# Patient Record
Sex: MALE | Race: WHITE | NOT HISPANIC OR LATINO | Employment: OTHER | URBAN - METROPOLITAN AREA
[De-identification: names, ages, dates, MRNs, and addresses within clinical notes are randomized per-mention and may not be internally consistent; named-entity substitution may affect disease eponyms.]

---

## 2019-05-21 ENCOUNTER — RECORDS - HEALTHEAST (OUTPATIENT)
Dept: LAB | Facility: CLINIC | Age: 69
End: 2019-05-21

## 2019-05-22 LAB
ALBUMIN SERPL-MCNC: 3.4 G/DL (ref 3.5–5)
ALP SERPL-CCNC: 73 U/L (ref 45–120)
ALT SERPL W P-5'-P-CCNC: 52 U/L (ref 0–45)
ANION GAP SERPL CALCULATED.3IONS-SCNC: 8 MMOL/L (ref 5–18)
AST SERPL W P-5'-P-CCNC: 27 U/L (ref 0–40)
BASOPHILS # BLD AUTO: 0.1 THOU/UL (ref 0–0.2)
BASOPHILS NFR BLD AUTO: 1 % (ref 0–2)
BILIRUB DIRECT SERPL-MCNC: 0.2 MG/DL
BILIRUB SERPL-MCNC: 0.6 MG/DL (ref 0–1)
BUN SERPL-MCNC: 14 MG/DL (ref 8–22)
CALCIUM SERPL-MCNC: 9.4 MG/DL (ref 8.5–10.5)
CHLORIDE BLD-SCNC: 106 MMOL/L (ref 98–107)
CHOLEST SERPL-MCNC: 228 MG/DL
CO2 SERPL-SCNC: 24 MMOL/L (ref 22–31)
CREAT SERPL-MCNC: 0.99 MG/DL (ref 0.7–1.3)
EOSINOPHIL # BLD AUTO: 0.5 THOU/UL (ref 0–0.4)
EOSINOPHIL NFR BLD AUTO: 5 % (ref 0–6)
ERYTHROCYTE [DISTWIDTH] IN BLOOD BY AUTOMATED COUNT: 12.9 % (ref 11–14.5)
FASTING STATUS PATIENT QL REPORTED: ABNORMAL
GFR SERPL CREATININE-BSD FRML MDRD: >60 ML/MIN/1.73M2
GLUCOSE BLD-MCNC: 115 MG/DL (ref 70–125)
HBA1C MFR BLD: 6.1 % (ref 4.2–6.1)
HCT VFR BLD AUTO: 48.5 % (ref 40–54)
HDLC SERPL-MCNC: 31 MG/DL
HGB BLD-MCNC: 15.8 G/DL (ref 14–18)
LDLC SERPL CALC-MCNC: 121 MG/DL
LYMPHOCYTES # BLD AUTO: 2.2 THOU/UL (ref 0.8–4.4)
LYMPHOCYTES NFR BLD AUTO: 22 % (ref 20–40)
MCH RBC QN AUTO: 33.9 PG (ref 27–34)
MCHC RBC AUTO-ENTMCNC: 32.6 G/DL (ref 32–36)
MCV RBC AUTO: 104 FL (ref 80–100)
MONOCYTES # BLD AUTO: 1 THOU/UL (ref 0–0.9)
MONOCYTES NFR BLD AUTO: 10 % (ref 2–10)
NEUTROPHILS # BLD AUTO: 6.4 THOU/UL (ref 2–7.7)
NEUTROPHILS NFR BLD AUTO: 63 % (ref 50–70)
PLATELET # BLD AUTO: 240 THOU/UL (ref 140–440)
PMV BLD AUTO: 11.1 FL (ref 8.5–12.5)
POTASSIUM BLD-SCNC: 4.3 MMOL/L (ref 3.5–5)
PROT SERPL-MCNC: 6.2 G/DL (ref 6–8)
RBC # BLD AUTO: 4.66 MILL/UL (ref 4.4–6.2)
SODIUM SERPL-SCNC: 138 MMOL/L (ref 136–145)
TRIGL SERPL-MCNC: 380 MG/DL
TSH SERPL DL<=0.005 MIU/L-ACNC: 1.55 UIU/ML (ref 0.3–5)
WBC: 10.3 THOU/UL (ref 4–11)

## 2019-07-31 ENCOUNTER — RECORDS - HEALTHEAST (OUTPATIENT)
Dept: LAB | Facility: CLINIC | Age: 69
End: 2019-07-31

## 2019-07-31 LAB
ALBUMIN SERPL-MCNC: 3.7 G/DL (ref 3.5–5)
ALP SERPL-CCNC: 115 U/L (ref 45–120)
ALT SERPL W P-5'-P-CCNC: 68 U/L (ref 0–45)
AST SERPL W P-5'-P-CCNC: 34 U/L (ref 0–40)
AST SERPL W P-5'-P-CCNC: 34 U/L (ref 0–40)
BILIRUB DIRECT SERPL-MCNC: <0.1 MG/DL
BILIRUB SERPL-MCNC: 0.2 MG/DL (ref 0–1)
CHOLEST SERPL-MCNC: 182 MG/DL
FASTING STATUS PATIENT QL REPORTED: YES
FERRITIN SERPL-MCNC: 150 NG/ML (ref 27–300)
HCV AB SERPL QL IA: NEGATIVE
HDLC SERPL-MCNC: 35 MG/DL
IRON SATN MFR SERPL: 31 % (ref 20–50)
IRON SERPL-MCNC: 81 UG/DL (ref 42–175)
LDLC SERPL CALC-MCNC: 93 MG/DL
PROT SERPL-MCNC: 6.6 G/DL (ref 6–8)
TIBC SERPL-MCNC: 264 UG/DL (ref 313–563)
TRANSFERRIN SERPL-MCNC: 211 MG/DL (ref 212–360)
TRIGL SERPL-MCNC: 268 MG/DL
VALPROATE SERPL-MCNC: 14.1 UG/ML (ref 50–150)

## 2019-09-04 ENCOUNTER — RECORDS - HEALTHEAST (OUTPATIENT)
Dept: LAB | Facility: CLINIC | Age: 69
End: 2019-09-04

## 2019-09-04 LAB — HGB BLD-MCNC: 14.5 G/DL (ref 14–18)

## 2019-09-05 LAB — HBA1C MFR BLD: 6.5 % (ref 4.2–6.1)

## 2019-10-02 ENCOUNTER — RECORDS - HEALTHEAST (OUTPATIENT)
Dept: LAB | Facility: CLINIC | Age: 69
End: 2019-10-02

## 2019-10-02 LAB
PSA SERPL-MCNC: 7.6 NG/ML (ref 0–4.5)
PSA SERPL-MCNC: 7.8 NG/ML (ref 0–4.5)

## 2020-02-06 ENCOUNTER — RECORDS - HEALTHEAST (OUTPATIENT)
Dept: LAB | Facility: CLINIC | Age: 70
End: 2020-02-06

## 2020-02-06 LAB
CREAT SERPL-MCNC: 1.01 MG/DL (ref 0.7–1.3)
GFR SERPL CREATININE-BSD FRML MDRD: >60 ML/MIN/1.73M2

## 2020-03-04 ENCOUNTER — RECORDS - HEALTHEAST (OUTPATIENT)
Dept: LAB | Facility: CLINIC | Age: 70
End: 2020-03-04

## 2020-03-04 LAB — HBA1C MFR BLD: 7.2 %

## 2020-10-13 ENCOUNTER — RECORDS - HEALTHEAST (OUTPATIENT)
Dept: LAB | Facility: CLINIC | Age: 70
End: 2020-10-13

## 2020-10-13 LAB
CREAT UR-MCNC: 213.3 MG/DL
MICROALBUMIN UR-MCNC: 3.35 MG/DL (ref 0–1.99)
MICROALBUMIN/CREAT UR: 15.7 MG/G

## 2020-10-14 LAB
ANION GAP SERPL CALCULATED.3IONS-SCNC: 8 MMOL/L (ref 5–18)
BUN SERPL-MCNC: 7 MG/DL (ref 8–22)
CALCIUM SERPL-MCNC: 8.3 MG/DL (ref 8.5–10.5)
CHLORIDE BLD-SCNC: 106 MMOL/L (ref 98–107)
CHOLEST SERPL-MCNC: 149 MG/DL
CO2 SERPL-SCNC: 24 MMOL/L (ref 22–31)
CREAT SERPL-MCNC: 0.91 MG/DL (ref 0.7–1.3)
ERYTHROCYTE [DISTWIDTH] IN BLOOD BY AUTOMATED COUNT: 12.9 % (ref 11–14.5)
FASTING STATUS PATIENT QL REPORTED: YES
GFR SERPL CREATININE-BSD FRML MDRD: >60 ML/MIN/1.73M2
GLUCOSE BLD-MCNC: 127 MG/DL (ref 70–125)
HBA1C MFR BLD: 8.5 %
HCT VFR BLD AUTO: 43.4 % (ref 40–54)
HDLC SERPL-MCNC: 26 MG/DL
HGB BLD-MCNC: 14.7 G/DL (ref 14–18)
LDLC SERPL CALC-MCNC: 89 MG/DL
MCH RBC QN AUTO: 33 PG (ref 27–34)
MCHC RBC AUTO-ENTMCNC: 33.9 G/DL (ref 32–36)
MCV RBC AUTO: 97 FL (ref 80–100)
PLATELET # BLD AUTO: 236 THOU/UL (ref 140–440)
PMV BLD AUTO: 11.3 FL (ref 8.5–12.5)
POTASSIUM BLD-SCNC: 3.9 MMOL/L (ref 3.5–5)
RBC # BLD AUTO: 4.46 MILL/UL (ref 4.4–6.2)
SODIUM SERPL-SCNC: 138 MMOL/L (ref 136–145)
TRIGL SERPL-MCNC: 169 MG/DL
WBC: 8.2 THOU/UL (ref 4–11)

## 2021-01-26 ENCOUNTER — RECORDS - HEALTHEAST (OUTPATIENT)
Dept: LAB | Facility: CLINIC | Age: 71
End: 2021-01-26

## 2021-01-27 LAB
HBA1C MFR BLD: 6.6 %
HGB BLD-MCNC: 15.6 G/DL (ref 14–18)
PSA SERPL-MCNC: 6.2 NG/ML (ref 0–4.5)

## 2021-04-06 ENCOUNTER — RECORDS - HEALTHEAST (OUTPATIENT)
Dept: LAB | Facility: CLINIC | Age: 71
End: 2021-04-06

## 2021-04-07 LAB
ANION GAP SERPL CALCULATED.3IONS-SCNC: 12 MMOL/L (ref 5–18)
BUN SERPL-MCNC: 17 MG/DL (ref 8–22)
CALCIUM SERPL-MCNC: 8.3 MG/DL (ref 8.5–10.5)
CHLORIDE BLD-SCNC: 107 MMOL/L (ref 98–107)
CO2 SERPL-SCNC: 19 MMOL/L (ref 22–31)
CREAT SERPL-MCNC: 0.9 MG/DL (ref 0.7–1.3)
ERYTHROCYTE [DISTWIDTH] IN BLOOD BY AUTOMATED COUNT: 13 % (ref 11–14.5)
FOLATE SERPL-MCNC: 5.8 NG/ML
GFR SERPL CREATININE-BSD FRML MDRD: >60 ML/MIN/1.73M2
GLUCOSE BLD-MCNC: 176 MG/DL (ref 70–125)
HCT VFR BLD AUTO: 45.8 % (ref 40–54)
HGB BLD-MCNC: 15 G/DL (ref 14–18)
MCH RBC QN AUTO: 33.2 PG (ref 27–34)
MCHC RBC AUTO-ENTMCNC: 32.8 G/DL (ref 32–36)
MCV RBC AUTO: 101 FL (ref 80–100)
PLATELET # BLD AUTO: 240 THOU/UL (ref 140–440)
PMV BLD AUTO: 11.2 FL (ref 8.5–12.5)
POTASSIUM BLD-SCNC: 3.9 MMOL/L (ref 3.5–5)
RBC # BLD AUTO: 4.52 MILL/UL (ref 4.4–6.2)
SODIUM SERPL-SCNC: 138 MMOL/L (ref 136–145)
TSH SERPL DL<=0.005 MIU/L-ACNC: 1.76 UIU/ML (ref 0.3–5)
VIT B12 SERPL-MCNC: 252 PG/ML (ref 213–816)
WBC: 8.4 THOU/UL (ref 4–11)

## 2021-05-28 ENCOUNTER — DOCUMENTATION ONLY (OUTPATIENT)
Dept: OTHER | Facility: CLINIC | Age: 71
End: 2021-05-28

## 2021-05-28 ENCOUNTER — AMBULATORY - HEALTHEAST (OUTPATIENT)
Dept: OTHER | Facility: CLINIC | Age: 71
End: 2021-05-28

## 2021-05-31 ENCOUNTER — RECORDS - HEALTHEAST (OUTPATIENT)
Dept: LAB | Facility: CLINIC | Age: 71
End: 2021-05-31

## 2021-06-02 LAB
HBA1C MFR BLD: 6.4 %
HGB BLD-MCNC: 15.4 G/DL (ref 14–18)

## 2021-10-06 ENCOUNTER — LAB REQUISITION (OUTPATIENT)
Dept: LAB | Facility: CLINIC | Age: 71
End: 2021-10-06
Payer: COMMERCIAL

## 2021-10-06 DIAGNOSIS — E78.5 HYPERLIPIDEMIA, UNSPECIFIED: ICD-10-CM

## 2021-10-06 DIAGNOSIS — N40.1 BENIGN PROSTATIC HYPERPLASIA WITH LOWER URINARY TRACT SYMPTOMS: ICD-10-CM

## 2021-10-06 LAB
CHOLEST SERPL-MCNC: 151 MG/DL
FASTING STATUS PATIENT QL REPORTED: ABNORMAL
HDLC SERPL-MCNC: 36 MG/DL
LDLC SERPL CALC-MCNC: 79 MG/DL
PSA SERPL-MCNC: 5.92 UG/L (ref 0–4.5)
TRIGL SERPL-MCNC: 179 MG/DL

## 2021-12-07 ENCOUNTER — LAB REQUISITION (OUTPATIENT)
Dept: LAB | Facility: CLINIC | Age: 71
End: 2021-12-07
Payer: COMMERCIAL

## 2021-12-07 DIAGNOSIS — E11.00 TYPE 2 DIABETES MELLITUS WITH HYPEROSMOLARITY WITHOUT NONKETOTIC HYPERGLYCEMIC-HYPEROSMOLAR COMA (NKHHC) (H): ICD-10-CM

## 2021-12-07 DIAGNOSIS — E11.9 TYPE 2 DIABETES MELLITUS WITHOUT COMPLICATIONS (H): ICD-10-CM

## 2021-12-07 DIAGNOSIS — N40.1 BENIGN PROSTATIC HYPERPLASIA WITH LOWER URINARY TRACT SYMPTOMS: ICD-10-CM

## 2021-12-08 LAB
ANION GAP SERPL CALCULATED.3IONS-SCNC: 12 MMOL/L (ref 5–18)
BUN SERPL-MCNC: 10 MG/DL (ref 8–22)
CALCIUM SERPL-MCNC: 9.3 MG/DL (ref 8.5–10.5)
CHLORIDE BLD-SCNC: 104 MMOL/L (ref 98–107)
CO2 SERPL-SCNC: 24 MMOL/L (ref 22–31)
CREAT SERPL-MCNC: 0.94 MG/DL (ref 0.7–1.3)
ERYTHROCYTE [DISTWIDTH] IN BLOOD BY AUTOMATED COUNT: 12.5 % (ref 10–15)
GFR SERPL CREATININE-BSD FRML MDRD: 82 ML/MIN/1.73M2
GLUCOSE BLD-MCNC: 128 MG/DL (ref 70–125)
HBA1C MFR BLD: 6.4 %
HCT VFR BLD AUTO: 46.6 % (ref 40–53)
HGB BLD-MCNC: 15.5 G/DL (ref 13.3–17.7)
MCH RBC QN AUTO: 34.4 PG (ref 26.5–33)
MCHC RBC AUTO-ENTMCNC: 33.3 G/DL (ref 31.5–36.5)
MCV RBC AUTO: 104 FL (ref 78–100)
PLATELET # BLD AUTO: 302 10E3/UL (ref 150–450)
POTASSIUM BLD-SCNC: 4.5 MMOL/L (ref 3.5–5)
RBC # BLD AUTO: 4.5 10E6/UL (ref 4.4–5.9)
SODIUM SERPL-SCNC: 140 MMOL/L (ref 136–145)
WBC # BLD AUTO: 10 10E3/UL (ref 4–11)

## 2021-12-08 PROCEDURE — 83036 HEMOGLOBIN GLYCOSYLATED A1C: CPT | Mod: ORL | Performed by: NURSE PRACTITIONER

## 2021-12-08 PROCEDURE — 80048 BASIC METABOLIC PNL TOTAL CA: CPT | Mod: ORL | Performed by: NURSE PRACTITIONER

## 2021-12-08 PROCEDURE — 85027 COMPLETE CBC AUTOMATED: CPT | Mod: ORL | Performed by: NURSE PRACTITIONER

## 2021-12-08 PROCEDURE — 36415 COLL VENOUS BLD VENIPUNCTURE: CPT | Mod: ORL | Performed by: NURSE PRACTITIONER

## 2021-12-08 PROCEDURE — P9604 ONE-WAY ALLOW PRORATED TRIP: HCPCS | Mod: ORL | Performed by: NURSE PRACTITIONER

## 2021-12-14 ENCOUNTER — LAB REQUISITION (OUTPATIENT)
Dept: LAB | Facility: CLINIC | Age: 71
End: 2021-12-14
Payer: COMMERCIAL

## 2021-12-14 DIAGNOSIS — D75.89 OTHER SPECIFIED DISEASES OF BLOOD AND BLOOD-FORMING ORGANS: ICD-10-CM

## 2021-12-15 LAB
FOLATE SERPL-MCNC: 4 NG/ML
VIT B12 SERPL-MCNC: 1273 PG/ML (ref 213–816)

## 2021-12-15 PROCEDURE — P9604 ONE-WAY ALLOW PRORATED TRIP: HCPCS | Mod: ORL | Performed by: NURSE PRACTITIONER

## 2021-12-15 PROCEDURE — 36415 COLL VENOUS BLD VENIPUNCTURE: CPT | Mod: ORL | Performed by: NURSE PRACTITIONER

## 2021-12-15 PROCEDURE — 82607 VITAMIN B-12: CPT | Mod: ORL | Performed by: NURSE PRACTITIONER

## 2021-12-15 PROCEDURE — 82746 ASSAY OF FOLIC ACID SERUM: CPT | Mod: ORL | Performed by: NURSE PRACTITIONER

## 2022-01-01 ENCOUNTER — TELEPHONE (OUTPATIENT)
Dept: GERIATRICS | Facility: CLINIC | Age: 72
End: 2022-01-01

## 2022-01-01 ENCOUNTER — LAB REQUISITION (OUTPATIENT)
Dept: LAB | Facility: CLINIC | Age: 72
End: 2022-01-01
Payer: COMMERCIAL

## 2022-01-01 ENCOUNTER — DOCUMENTATION ONLY (OUTPATIENT)
Dept: GERIATRICS | Facility: CLINIC | Age: 72
End: 2022-01-01

## 2022-01-01 ENCOUNTER — TRANSITIONAL CARE UNIT VISIT (OUTPATIENT)
Dept: GERIATRICS | Facility: CLINIC | Age: 72
End: 2022-01-01
Payer: COMMERCIAL

## 2022-01-01 VITALS
DIASTOLIC BLOOD PRESSURE: 72 MMHG | RESPIRATION RATE: 18 BRPM | OXYGEN SATURATION: 96 % | TEMPERATURE: 97.5 F | HEART RATE: 86 BPM | WEIGHT: 210 LBS | BODY MASS INDEX: 26.11 KG/M2 | HEIGHT: 75 IN | SYSTOLIC BLOOD PRESSURE: 130 MMHG

## 2022-01-01 VITALS
BODY MASS INDEX: 28.73 KG/M2 | SYSTOLIC BLOOD PRESSURE: 132 MMHG | RESPIRATION RATE: 18 BRPM | WEIGHT: 194 LBS | TEMPERATURE: 97.2 F | HEART RATE: 62 BPM | OXYGEN SATURATION: 97 % | DIASTOLIC BLOOD PRESSURE: 77 MMHG | HEIGHT: 69 IN

## 2022-01-01 DIAGNOSIS — Z87.820 HISTORY OF TRAUMATIC BRAIN INJURY: ICD-10-CM

## 2022-01-01 DIAGNOSIS — R29.6 FALLS FREQUENTLY: ICD-10-CM

## 2022-01-01 DIAGNOSIS — D64.9 ANEMIA, UNSPECIFIED: ICD-10-CM

## 2022-01-01 DIAGNOSIS — E53.8 VITAMIN B 12 DEFICIENCY: ICD-10-CM

## 2022-01-01 DIAGNOSIS — E11.9 CONTROLLED TYPE 2 DIABETES MELLITUS WITHOUT COMPLICATION, WITHOUT LONG-TERM CURRENT USE OF INSULIN (H): ICD-10-CM

## 2022-01-01 DIAGNOSIS — F32.A DEPRESSION, UNSPECIFIED DEPRESSION TYPE: ICD-10-CM

## 2022-01-01 DIAGNOSIS — Z72.0 TOBACCO ABUSE: ICD-10-CM

## 2022-01-01 DIAGNOSIS — S73.035D ANTERIOR DISLOCATION OF LEFT HIP, SUBSEQUENT ENCOUNTER: Primary | ICD-10-CM

## 2022-01-01 DIAGNOSIS — E11.9 TYPE 2 DIABETES MELLITUS WITHOUT COMPLICATION, WITHOUT LONG-TERM CURRENT USE OF INSULIN (H): ICD-10-CM

## 2022-01-01 DIAGNOSIS — E78.5 HYPERLIPIDEMIA LDL GOAL <100: ICD-10-CM

## 2022-01-01 DIAGNOSIS — R52 PAIN: Primary | ICD-10-CM

## 2022-01-01 DIAGNOSIS — E11.9 TYPE 2 DIABETES MELLITUS WITHOUT COMPLICATIONS (H): ICD-10-CM

## 2022-01-01 DIAGNOSIS — R84.0 ABNORMAL LEVEL OF ENZYMES IN SPECIMENS FROM RESPIRATORY ORGANS AND THORAX: ICD-10-CM

## 2022-01-01 DIAGNOSIS — R53.81 PHYSICAL DECONDITIONING: ICD-10-CM

## 2022-01-01 DIAGNOSIS — S72.002D CLOSED FRACTURE OF NECK OF LEFT FEMUR WITH ROUTINE HEALING, SUBSEQUENT ENCOUNTER: ICD-10-CM

## 2022-01-01 DIAGNOSIS — D72.829 LEUKOCYTOSIS, UNSPECIFIED TYPE: ICD-10-CM

## 2022-01-01 DIAGNOSIS — I10 PRIMARY HYPERTENSION: ICD-10-CM

## 2022-01-01 DIAGNOSIS — R41.89 COGNITIVE IMPAIRMENT: ICD-10-CM

## 2022-01-01 DIAGNOSIS — E78.5 HYPERLIPIDEMIA, UNSPECIFIED: ICD-10-CM

## 2022-01-01 LAB
ANION GAP SERPL CALCULATED.3IONS-SCNC: 12 MMOL/L (ref 7–15)
BUN SERPL-MCNC: 17.2 MG/DL (ref 8–23)
CALCIUM SERPL-MCNC: 8.5 MG/DL (ref 8.8–10.2)
CHLORIDE SERPL-SCNC: 103 MMOL/L (ref 98–107)
CREAT SERPL-MCNC: 0.85 MG/DL (ref 0.67–1.17)
DEPRECATED HCO3 PLAS-SCNC: 22 MMOL/L (ref 22–29)
ERYTHROCYTE [DISTWIDTH] IN BLOOD BY AUTOMATED COUNT: 13 % (ref 10–15)
ERYTHROCYTE [DISTWIDTH] IN BLOOD BY AUTOMATED COUNT: 13.2 % (ref 10–15)
ERYTHROCYTE [DISTWIDTH] IN BLOOD BY AUTOMATED COUNT: 13.2 % (ref 10–15)
GFR SERPL CREATININE-BSD FRML MDRD: >90 ML/MIN/1.73M2
GLUCOSE SERPL-MCNC: 170 MG/DL (ref 70–99)
HCT VFR BLD AUTO: 38.6 % (ref 40–53)
HCT VFR BLD AUTO: 39.3 % (ref 40–53)
HCT VFR BLD AUTO: 41.5 % (ref 40–53)
HGB BLD-MCNC: 12.3 G/DL (ref 13.3–17.7)
HGB BLD-MCNC: 12.7 G/DL (ref 13.3–17.7)
HGB BLD-MCNC: 12.9 G/DL (ref 13.3–17.7)
MCH RBC QN AUTO: 33.2 PG (ref 26.5–33)
MCH RBC QN AUTO: 33.2 PG (ref 26.5–33)
MCH RBC QN AUTO: 33.4 PG (ref 26.5–33)
MCHC RBC AUTO-ENTMCNC: 31.1 G/DL (ref 31.5–36.5)
MCHC RBC AUTO-ENTMCNC: 31.9 G/DL (ref 31.5–36.5)
MCHC RBC AUTO-ENTMCNC: 32.3 G/DL (ref 31.5–36.5)
MCV RBC AUTO: 103 FL (ref 78–100)
MCV RBC AUTO: 104 FL (ref 78–100)
MCV RBC AUTO: 107 FL (ref 78–100)
PLATELET # BLD AUTO: 486 10E3/UL (ref 150–450)
PLATELET # BLD AUTO: 507 10E3/UL (ref 150–450)
PLATELET # BLD AUTO: 572 10E3/UL (ref 150–450)
POTASSIUM SERPL-SCNC: 3.7 MMOL/L (ref 3.4–5.3)
RBC # BLD AUTO: 3.7 10E6/UL (ref 4.4–5.9)
RBC # BLD AUTO: 3.8 10E6/UL (ref 4.4–5.9)
RBC # BLD AUTO: 3.88 10E6/UL (ref 4.4–5.9)
SODIUM SERPL-SCNC: 137 MMOL/L (ref 136–145)
WBC # BLD AUTO: 11.9 10E3/UL (ref 4–11)
WBC # BLD AUTO: 12.1 10E3/UL (ref 4–11)
WBC # BLD AUTO: 13.4 10E3/UL (ref 4–11)

## 2022-01-01 PROCEDURE — P9604 ONE-WAY ALLOW PRORATED TRIP: HCPCS | Mod: ORL | Performed by: NURSE PRACTITIONER

## 2022-01-01 PROCEDURE — 36415 COLL VENOUS BLD VENIPUNCTURE: CPT | Mod: ORL | Performed by: NURSE PRACTITIONER

## 2022-01-01 PROCEDURE — 85027 COMPLETE CBC AUTOMATED: CPT | Mod: ORL | Performed by: NURSE PRACTITIONER

## 2022-01-01 PROCEDURE — P9603 ONE-WAY ALLOW PRORATED MILES: HCPCS | Mod: ORL | Performed by: NURSE PRACTITIONER

## 2022-01-01 PROCEDURE — 99316 NF DSCHRG MGMT 30 MIN+: CPT | Performed by: NURSE PRACTITIONER

## 2022-01-01 PROCEDURE — 99310 SBSQ NF CARE HIGH MDM 45: CPT | Performed by: NURSE PRACTITIONER

## 2022-01-01 PROCEDURE — 80048 BASIC METABOLIC PNL TOTAL CA: CPT | Mod: ORL | Performed by: NURSE PRACTITIONER

## 2022-01-01 RX ORDER — DIVALPROEX SODIUM 500 MG/1
500 TABLET, DELAYED RELEASE ORAL DAILY
Start: 2022-01-01

## 2022-01-01 RX ORDER — ACETAMINOPHEN 325 MG/1
325 TABLET ORAL EVERY 4 HOURS PRN
Start: 2022-01-01

## 2022-01-01 RX ORDER — CITALOPRAM HYDROBROMIDE 20 MG/1
20 TABLET ORAL DAILY
Start: 2022-01-01

## 2022-01-01 RX ORDER — OXYCODONE HYDROCHLORIDE 5 MG/1
5 TABLET ORAL EVERY 4 HOURS PRN
COMMUNITY
End: 2022-01-01

## 2022-01-01 RX ORDER — MEMANTINE HYDROCHLORIDE 10 MG/1
10 TABLET ORAL 2 TIMES DAILY
Start: 2022-01-01

## 2022-01-01 RX ORDER — PRAVASTATIN SODIUM 40 MG
40 TABLET ORAL DAILY
Start: 2022-01-01

## 2022-01-01 RX ORDER — AMLODIPINE BESYLATE 2.5 MG/1
2.5 TABLET ORAL DAILY
Start: 2022-01-01

## 2022-01-01 RX ORDER — METHOCARBAMOL 500 MG/1
500 TABLET, FILM COATED ORAL 4 TIMES DAILY PRN
Start: 2022-01-01

## 2022-01-01 RX ORDER — METFORMIN HCL 500 MG
1000 TABLET, EXTENDED RELEASE 24 HR ORAL
Start: 2022-01-01

## 2022-01-01 RX ORDER — OXYCODONE HYDROCHLORIDE 5 MG/1
5 TABLET ORAL EVERY 4 HOURS PRN
Qty: 30 TABLET | Refills: 0 | Status: SHIPPED | OUTPATIENT
Start: 2022-01-01

## 2022-04-26 ENCOUNTER — LAB REQUISITION (OUTPATIENT)
Dept: LAB | Facility: CLINIC | Age: 72
End: 2022-04-26
Payer: COMMERCIAL

## 2022-04-26 DIAGNOSIS — Z12.5 ENCOUNTER FOR SCREENING FOR MALIGNANT NEOPLASM OF PROSTATE: ICD-10-CM

## 2022-04-26 DIAGNOSIS — E11.9 TYPE 2 DIABETES MELLITUS WITHOUT COMPLICATIONS (H): ICD-10-CM

## 2022-04-26 DIAGNOSIS — N18.9 CHRONIC KIDNEY DISEASE, UNSPECIFIED: ICD-10-CM

## 2022-04-27 LAB
ANION GAP SERPL CALCULATED.3IONS-SCNC: 15 MMOL/L (ref 5–18)
BUN SERPL-MCNC: 14 MG/DL (ref 8–28)
CALCIUM SERPL-MCNC: 8.8 MG/DL (ref 8.5–10.5)
CHLORIDE BLD-SCNC: 104 MMOL/L (ref 98–107)
CO2 SERPL-SCNC: 21 MMOL/L (ref 22–31)
CREAT SERPL-MCNC: 0.82 MG/DL (ref 0.7–1.3)
ERYTHROCYTE [DISTWIDTH] IN BLOOD BY AUTOMATED COUNT: 12.7 % (ref 10–15)
GFR SERPL CREATININE-BSD FRML MDRD: >90 ML/MIN/1.73M2
GLUCOSE BLD-MCNC: 157 MG/DL (ref 70–125)
HBA1C MFR BLD: 5.9 %
HCT VFR BLD AUTO: 44.1 % (ref 40–53)
HGB BLD-MCNC: 15 G/DL (ref 13.3–17.7)
MCH RBC QN AUTO: 35.5 PG (ref 26.5–33)
MCHC RBC AUTO-ENTMCNC: 34 G/DL (ref 31.5–36.5)
MCV RBC AUTO: 105 FL (ref 78–100)
PLATELET # BLD AUTO: 267 10E3/UL (ref 150–450)
POTASSIUM BLD-SCNC: 3.7 MMOL/L (ref 3.5–5)
PSA SERPL-MCNC: 5.48 UG/L (ref 0–6.5)
RBC # BLD AUTO: 4.22 10E6/UL (ref 4.4–5.9)
SODIUM SERPL-SCNC: 140 MMOL/L (ref 136–145)
WBC # BLD AUTO: 6.2 10E3/UL (ref 4–11)

## 2022-04-27 PROCEDURE — P9604 ONE-WAY ALLOW PRORATED TRIP: HCPCS | Mod: ORL | Performed by: NURSE PRACTITIONER

## 2022-04-27 PROCEDURE — 36415 COLL VENOUS BLD VENIPUNCTURE: CPT | Mod: ORL | Performed by: NURSE PRACTITIONER

## 2022-04-27 PROCEDURE — 83036 HEMOGLOBIN GLYCOSYLATED A1C: CPT | Mod: ORL | Performed by: NURSE PRACTITIONER

## 2022-04-27 PROCEDURE — 85027 COMPLETE CBC AUTOMATED: CPT | Mod: ORL | Performed by: NURSE PRACTITIONER

## 2022-04-27 PROCEDURE — G0103 PSA SCREENING: HCPCS | Mod: ORL | Performed by: NURSE PRACTITIONER

## 2022-04-27 PROCEDURE — 80048 BASIC METABOLIC PNL TOTAL CA: CPT | Mod: ORL | Performed by: NURSE PRACTITIONER

## 2022-05-07 ENCOUNTER — HOSPITAL ENCOUNTER (EMERGENCY)
Facility: HOSPITAL | Age: 72
Discharge: HOME | End: 2022-05-07
Attending: EMERGENCY MEDICINE | Admitting: EMERGENCY MEDICINE
Payer: COMMERCIAL

## 2022-05-07 VITALS
HEART RATE: 109 BPM | WEIGHT: 220 LBS | SYSTOLIC BLOOD PRESSURE: 163 MMHG | DIASTOLIC BLOOD PRESSURE: 92 MMHG | BODY MASS INDEX: 28.23 KG/M2 | OXYGEN SATURATION: 98 % | HEIGHT: 74 IN | RESPIRATION RATE: 20 BRPM | TEMPERATURE: 98.6 F

## 2022-05-07 DIAGNOSIS — Z00.00 NORMAL EXAM: ICD-10-CM

## 2022-05-07 DIAGNOSIS — G89.29 CHRONIC RIGHT-SIDED LOW BACK PAIN WITHOUT SCIATICA: ICD-10-CM

## 2022-05-07 DIAGNOSIS — M54.50 CHRONIC RIGHT-SIDED LOW BACK PAIN WITHOUT SCIATICA: ICD-10-CM

## 2022-05-07 PROBLEM — N52.9 ERECTILE DYSFUNCTION: Status: ACTIVE | Noted: 2022-05-07

## 2022-05-07 PROBLEM — F39 EPISODIC MOOD DISORDER (H): Status: ACTIVE | Noted: 2018-11-29

## 2022-05-07 PROBLEM — E66.9 MODERATE OBESITY: Status: ACTIVE | Noted: 2018-11-29

## 2022-05-07 PROBLEM — H91.90 HEARING LOSS: Status: ACTIVE | Noted: 2022-05-07

## 2022-05-07 PROBLEM — S12.9XXA CLOSED FRACTURE OF CERVICAL VERTEBRA (H): Status: ACTIVE | Noted: 2017-12-14

## 2022-05-07 PROBLEM — M50.20 HERNIATED DISC, CERVICAL: Status: ACTIVE | Noted: 2017-12-14

## 2022-05-07 PROBLEM — E53.8 B12 DEFICIENCY: Status: ACTIVE | Noted: 2017-11-20

## 2022-05-07 PROBLEM — F19.21: Status: ACTIVE | Noted: 2019-03-14

## 2022-05-07 PROBLEM — F06.70 MILD NEUROCOGNITIVE DISORDER DUE TO TRAUMATIC BRAIN INJURY (H): Status: ACTIVE | Noted: 2019-03-14

## 2022-05-07 PROBLEM — S12.100A: Status: ACTIVE | Noted: 2017-12-14

## 2022-05-07 PROBLEM — S06.9XAS MILD NEUROCOGNITIVE DISORDER DUE TO TRAUMATIC BRAIN INJURY (H): Status: ACTIVE | Noted: 2019-03-14

## 2022-05-07 PROBLEM — E78.5 DYSLIPIDEMIA: Status: ACTIVE | Noted: 2022-05-07

## 2022-05-07 PROBLEM — R73.01 IMPAIRED FASTING GLUCOSE: Status: ACTIVE | Noted: 2018-11-29

## 2022-05-07 LAB
ALBUMIN SERPL-MCNC: 3.9 G/DL (ref 3.5–5)
ALP SERPL-CCNC: 99 U/L (ref 45–120)
ALT SERPL W P-5'-P-CCNC: 142 U/L (ref 0–45)
ANION GAP SERPL CALCULATED.3IONS-SCNC: 13 MMOL/L (ref 5–18)
AST SERPL W P-5'-P-CCNC: 167 U/L (ref 0–40)
BILIRUB SERPL-MCNC: 1 MG/DL (ref 0–1)
BUN SERPL-MCNC: 6 MG/DL (ref 8–28)
CALCIUM SERPL-MCNC: 9.1 MG/DL (ref 8.5–10.5)
CHLORIDE BLD-SCNC: 103 MMOL/L (ref 98–107)
CO2 SERPL-SCNC: 22 MMOL/L (ref 22–31)
CREAT SERPL-MCNC: 0.95 MG/DL (ref 0.7–1.3)
ERYTHROCYTE [DISTWIDTH] IN BLOOD BY AUTOMATED COUNT: 12.9 % (ref 10–15)
GFR SERPL CREATININE-BSD FRML MDRD: 86 ML/MIN/1.73M2
GLUCOSE BLD-MCNC: 155 MG/DL (ref 70–125)
GLUCOSE BLDC GLUCOMTR-MCNC: 141 MG/DL (ref 70–99)
HCT VFR BLD AUTO: 46.9 % (ref 40–53)
HGB BLD-MCNC: 16.1 G/DL (ref 13.3–17.7)
MCH RBC QN AUTO: 35.5 PG (ref 26.5–33)
MCHC RBC AUTO-ENTMCNC: 34.3 G/DL (ref 31.5–36.5)
MCV RBC AUTO: 104 FL (ref 78–100)
PLATELET # BLD AUTO: 259 10E3/UL (ref 150–450)
POTASSIUM BLD-SCNC: 3.9 MMOL/L (ref 3.5–5)
PROT SERPL-MCNC: 7.3 G/DL (ref 6–8)
RBC # BLD AUTO: 4.53 10E6/UL (ref 4.4–5.9)
SODIUM SERPL-SCNC: 138 MMOL/L (ref 136–145)
WBC # BLD AUTO: 8 10E3/UL (ref 4–11)

## 2022-05-07 PROCEDURE — 99283 EMERGENCY DEPT VISIT LOW MDM: CPT

## 2022-05-07 PROCEDURE — 80053 COMPREHEN METABOLIC PANEL: CPT | Performed by: EMERGENCY MEDICINE

## 2022-05-07 PROCEDURE — 36415 COLL VENOUS BLD VENIPUNCTURE: CPT | Performed by: EMERGENCY MEDICINE

## 2022-05-07 PROCEDURE — 85027 COMPLETE CBC AUTOMATED: CPT | Performed by: EMERGENCY MEDICINE

## 2022-05-07 PROCEDURE — 82040 ASSAY OF SERUM ALBUMIN: CPT | Performed by: EMERGENCY MEDICINE

## 2022-05-07 NOTE — ED TRIAGE NOTES
"Patient arrives to triage from [facility], patient resides at [facility] and had been reported missing for about two weeks now.  Per officer MITCHELL Garcia, patient needs medical clearance before [facility] will take patient back.  Patient had been found staying at another house.  Patient denies chest pain, denies shortness of breath, no N/V, only complaint is chronic back pain.  Patient is alert and oriented x4.  Patient reports \"I've just been mixed up in my head, I just wondered off,\" when asked why he never returned to his home.      "

## 2022-05-07 NOTE — ED PROVIDER NOTES
"EMERGENCY DEPARTMENT NOTE     Name: Willi Gamez    Age/Sex: 72 year old male   MRN: [id number]   Evaluation Date & Time:  5/7/2022  5:29 PM    PCP:    Chris Crawford   ED Provider: Dexter Sparrow D.O.       CHIEF COMPLAINT    Back Pain       DIAGNOSIS & DISPOSITION     1. Normal exam    2. Chronic right-sided low back pain without sciatica      DISPOSITION: Discharge back to group home    At the conclusion of the encounter I discussed the results of all of the tests and the disposition. The questions were answered. The patient or family acknowledged understanding and was agreeable with the care plan.    TOTAL CRITICAL CARE TIME (EXCLUDING PROCEDURES): Not applicable    PROCEDURES:   None    EMERGENCY DEPARTMENT COURSE/MEDICAL DECISION MAKING   6:01 PM I met with the patient to gather history and to perform my initial exam.  We discussed treatment options and the plan for care while in the Emergency Department. PPE: Provider wore gloves, N95 mask, eye protection, surgical cap, and paper mask.   7:34 PM Discussed discharge with the patient. He is agreeable with this plan.    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  Patient was missing from his group home for 2 weeks.  Patient apparently had wandered off.  Patient reports that he was staying with \"a friend\".  Patient was eventually found by the police department and has been referred to the emergency department for screening exam.  Patient's only complaint of with some right-sided lower back pain which she states is chronic and present for 2 years.  Triage note reviewed:Patient arrives to triage from [facility], patient resides at [facility] and had been reported missing for about two weeks now.  Per officer MITCHELL Garcia, patient needs medical clearance before [facility] will take patient back.  Patient had been found staying " "at another house.  Patient denies chest pain, denies shortness of breath, no N/V, only complaint is chronic back pain.  Patient is alert and oriented x4.  Patient reports \"I've just been mixed up in my head, I just wondered off,\" when asked why he never returned to his home.    Vital signs:BP (!) 163/92   Pulse 109   Temp 98.6  F (37  C) (Oral)   Resp 20   Ht 1.88 m (6' 2\")   Wt 99.8 kg (220 lb)   SpO2 98%   BMI 28.25 kg/m    Pertinent physical exam findings:  General: Alert oriented person place and situation  HEENT: Head atraumatic, cervical spine nontender, no meningeal irritation  Cardiac: Normal cardiac exam  Pulmonary: Lungs are clear to ascultation bilaterally with good breath sounds  Abdomen: Soft nontender, positive bowel sounds.  No organomegaly or mass  : Circumcised normal testicular exam  Musculoskeletal: No tenderness of thoracic or lumbar spine, no CVA tenderness  Neuro: Cranial nerves 2 through 12 are intact.  5 out of 5 motor strength upper and lower extremities.  Intact sensation to light touch and positional sense.  No pronator drift.  Normal cerebellar function with serial fingers and heel-to-shin.  Diagnostic studies:  Imaging:  No orders to display      Lab:  Labs Ordered and Resulted from Time of ED Arrival to Time of ED Departure   CBC WITH PLATELETS - Abnormal       Result Value    WBC Count 8.0      RBC Count 4.53      Hemoglobin 16.1      Hematocrit 46.9       (*)     MCH 35.5 (*)     MCHC 34.3      RDW 12.9      Platelet Count 259     COMPREHENSIVE METABOLIC PANEL - Abnormal    Sodium 138      Potassium 3.9      Chloride 103      Carbon Dioxide (CO2) 22      Anion Gap 13      Urea Nitrogen 6 (*)     Creatinine 0.95      Calcium 9.1      Glucose 155 (*)     Alkaline Phosphatase 99       (*)      (*)     Protein Total 7.3      Albumin 3.9      Bilirubin Total 1.0      GFR Estimate 86     GLUCOSE BY METER - Abnormal    GLUCOSE BY METER POCT 141 (*)     " "  Interventions:None  Medical decision making: Patient was sent to the emergency department for screening after having wandered from his group home situation.  Patient reports he was staying with another person for 2 weeks he described as \"a friend\".  Patient's only complaint was of right lower back pain which she states has been present for 2 years.  Exam was normal.  Screening laboratory CBC and comprehensive metabolic profile within normal limits.  Patient will be discharged back to the group home for continued monitoring.    ED INTERVENTIONS   Medications - No data to display    DISCHARGE MEDICATIONS        Review of your medicines      UNREVIEWED medicines. Ask your doctor about these medicines      Dose / Directions   buPROPion 150 MG 12 hr tablet  Commonly known as: WELLBUTRIN SR      Dose: 150 mg  [BUPROPION (WELLBUTRIN SR) 150 MG 12 HR TABLET] Take 150 mg by mouth every morning.  Refills: 0     cyanocobalamin 1000 MCG/ML injection  Commonly known as: CYANOCOBALAMIN      Dose: 1,000 mcg  [CYANOCOBALAMIN 1,000 MCG/ML INJECTION] Inject 1,000 mcg into the shoulder, thigh, or buttocks every 30 (thirty) days.  Refills: 0     nicotine 21 MG/24HR 24 hr patch  Commonly known as: NICODERM CQ      Dose: 1 patch  [NICOTINE (NICODERM CQ) 21 MG/24 HR] Place 1 patch on the skin daily.  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    The patient has been missing from [facility] for the past two weeks. He is brought in " by Christos YOON, as the patient needs medical clearance before they will take him back to [place]. The patient states that he has been staying at a friend's house for the time he has been gone. The patient states that he has been feeling good other than some chronic low back pain. This pain is about the same as normal. He denies any leg numbness or weakness, urinary incontinence, chest pain, shortness of breath, abdominal pain, and any other symptoms at this time.     REVIEW OF SYSTEMS:   Constitutional: Negative for fever.   HENT: Negative for URI symptoms or sore throat.    Cardiac: Negative for  chest pain,palpitations, near syncope or syncope  Respiratory: Negative for cough and shortness of breath.    Gastrointestinal: Negative for abdominal pain, nausea, vomiting, constipation, diarrhea, rectal bleeding or melena.  Genitourinary: Negative for dysuria, flank pain and hematuria.   Musculoskeletal: Positive for chronic low back pain.  Skin: Negative for  rash  Neurological: Negative for dizziness, headache, syncope, speech difficulty, unilateral weakness or imbalance with walking.   Hematological: Negative for adenopathy. Does not bruise/bleed easily.   Psychiatric/Behavioral: Negative for confusion.       PATIENT HISTORY   History reviewed. No pertinent past medical history.  Patient Active Problem List   Diagnosis     B12 deficiency     Chondromalacia     Closed fracture of cervical vertebra (H)     Closed fracture of odontoid process of axis (H)     Combined opioid and non-opioid drug dependence, in remission (H)     Dyslipidemia     Episodic mood disorder (H)     Erectile dysfunction     Impaired fasting glucose     Herniated disc, cervical     Hearing loss     Mild neurocognitive disorder due to traumatic brain injury (H)     Moderate obesity     History reviewed. No pertinent surgical history.  Social Histrory  Smoking: The patient quit smoking around 20 years ago. He just recently began smoking  again.  Alcohol Use: The patient does not use alcohol.  No Known Allergies      OUTPATIENT MEDICATIONS     New Prescriptions    No medications on file      Vitals:    05/07/22 1815 05/07/22 1830 05/07/22 1845 05/07/22 1900   BP:  (!) 161/93 (!) 172/100 (!) 163/92   Pulse: 93 92 90 90   Resp:       Temp:       TempSrc:       SpO2: 98% 95% 95% 97%   Weight:       Height:           Physical Exam   Constitutional: Oriented to person, place, and time. Appears well-developed and well-nourished.   HEENT:    Head: Atraumatic.   Neck: Normal range of motion. Neck supple.   Cardiovascular: Normal rate, regular rhythm and normal heart sounds.    Pulmonary/Chest: Normal effort  and breath sounds normal.   Abdominal: Soft. Bowel sounds are normal.   Musculoskeletal: Normal range of motion.   Neurological: Alert and oriented to person, place, and time. Normal strength.No sensory deficit. No cranial nerve deficit . Skin: Skin is warm and dry.   Psychiatric: Normal mood and affect. Behavior is normal. Thought content normal.       DIAGNOSTICS    LABORATORY FINDINGS (REVIEWED AND INTERPRETED):  Labs Ordered and Resulted from Time of ED Arrival to Time of ED Departure   CBC WITH PLATELETS - Abnormal       Result Value    WBC Count 8.0      RBC Count 4.53      Hemoglobin 16.1      Hematocrit 46.9       (*)     MCH 35.5 (*)     MCHC 34.3      RDW 12.9      Platelet Count 259     COMPREHENSIVE METABOLIC PANEL - Abnormal    Sodium 138      Potassium 3.9      Chloride 103      Carbon Dioxide (CO2) 22      Anion Gap 13      Urea Nitrogen 6 (*)     Creatinine 0.95      Calcium 9.1      Glucose 155 (*)     Alkaline Phosphatase 99       (*)      (*)     Protein Total 7.3      Albumin 3.9      Bilirubin Total 1.0      GFR Estimate 86     GLUCOSE BY METER - Abnormal    GLUCOSE BY METER POCT 141 (*)          IMAGING (REVIEWED AND INTERPRETED):  No orders to display           ECG (REVIEWED AND INTERPRETED):   None      I,  Kassi Chaparro, am serving as a scribe to document services personally performed by Dexter Sparrow D.O., based on my observation and the provider s statements to me.    I, Dexter Sparrow D.O., attest that Kassi Chaparro is acting in a scribe capacity, has observed my performance of the services and has documented them in accordance with my direction.    Dexter Sparrow D.O.  EMERGENCY MEDICINE   05/07/22  [facility] EMERGENCY DEPARTMENT  [address] [place] [id number]  [phone]  Dept: [phone]     Dexter Sparrow,   05/07/22 2143

## 2022-05-07 NOTE — ED NOTES
TC to Eliud sancheser, Eliud brother referred writer to call [facility], talked with Dee AGOSTO. #[phone], Dee AGOSTO to call [facility] Karina Saunders at #[phone]

## 2022-08-15 ENCOUNTER — LAB REQUISITION (OUTPATIENT)
Dept: LAB | Facility: CLINIC | Age: 72
End: 2022-08-15
Payer: COMMERCIAL

## 2022-08-15 DIAGNOSIS — E11.00 TYPE 2 DIABETES MELLITUS WITH HYPEROSMOLARITY WITHOUT NONKETOTIC HYPERGLYCEMIC-HYPEROSMOLAR COMA (NKHHC) (H): ICD-10-CM

## 2022-08-16 ENCOUNTER — LAB REQUISITION (OUTPATIENT)
Dept: LAB | Facility: CLINIC | Age: 72
End: 2022-08-16
Payer: COMMERCIAL

## 2022-08-16 DIAGNOSIS — A08.4 VIRAL INTESTINAL INFECTION, UNSPECIFIED: ICD-10-CM

## 2022-08-17 LAB
ANION GAP SERPL CALCULATED.3IONS-SCNC: 18 MMOL/L (ref 7–15)
BUN SERPL-MCNC: 11.7 MG/DL (ref 8–23)
CALCIUM SERPL-MCNC: 9.1 MG/DL (ref 8.8–10.2)
CHLORIDE SERPL-SCNC: 100 MMOL/L (ref 98–107)
CREAT SERPL-MCNC: 0.87 MG/DL (ref 0.67–1.17)
DEPRECATED HCO3 PLAS-SCNC: 17 MMOL/L (ref 22–29)
ERYTHROCYTE [DISTWIDTH] IN BLOOD BY AUTOMATED COUNT: 12.7 % (ref 10–15)
GFR SERPL CREATININE-BSD FRML MDRD: >90 ML/MIN/1.73M2
GLUCOSE SERPL-MCNC: 148 MG/DL (ref 70–99)
HBA1C MFR BLD: 6.7 %
HCT VFR BLD AUTO: 45.4 % (ref 40–53)
HGB BLD-MCNC: 15 G/DL (ref 13.3–17.7)
MCH RBC QN AUTO: 34.3 PG (ref 26.5–33)
MCHC RBC AUTO-ENTMCNC: 33 G/DL (ref 31.5–36.5)
MCV RBC AUTO: 104 FL (ref 78–100)
PLATELET # BLD AUTO: 231 10E3/UL (ref 150–450)
POTASSIUM SERPL-SCNC: 4.3 MMOL/L (ref 3.4–5.3)
RBC # BLD AUTO: 4.37 10E6/UL (ref 4.4–5.9)
SODIUM SERPL-SCNC: 135 MMOL/L (ref 136–145)
WBC # BLD AUTO: 7.8 10E3/UL (ref 4–11)

## 2022-08-17 PROCEDURE — 83036 HEMOGLOBIN GLYCOSYLATED A1C: CPT | Mod: ORL | Performed by: NURSE PRACTITIONER

## 2022-08-17 PROCEDURE — 36415 COLL VENOUS BLD VENIPUNCTURE: CPT | Mod: ORL | Performed by: NURSE PRACTITIONER

## 2022-08-17 PROCEDURE — 80048 BASIC METABOLIC PNL TOTAL CA: CPT | Mod: ORL | Performed by: NURSE PRACTITIONER

## 2022-08-17 PROCEDURE — 85027 COMPLETE CBC AUTOMATED: CPT | Mod: ORL | Performed by: NURSE PRACTITIONER

## 2022-08-17 PROCEDURE — P9604 ONE-WAY ALLOW PRORATED TRIP: HCPCS | Mod: ORL | Performed by: NURSE PRACTITIONER

## 2022-08-21 ENCOUNTER — LAB REQUISITION (OUTPATIENT)
Dept: LAB | Facility: CLINIC | Age: 72
End: 2022-08-21
Payer: COMMERCIAL

## 2022-08-21 DIAGNOSIS — D75.89 OTHER SPECIFIED DISEASES OF BLOOD AND BLOOD-FORMING ORGANS: ICD-10-CM

## 2022-08-24 LAB
FOLATE SERPL-MCNC: 3.6 NG/ML (ref 4.6–34.8)
VIT B12 SERPL-MCNC: 1307 PG/ML (ref 232–1245)

## 2022-08-24 PROCEDURE — 82607 VITAMIN B-12: CPT | Mod: ORL | Performed by: NURSE PRACTITIONER

## 2022-08-24 PROCEDURE — 36415 COLL VENOUS BLD VENIPUNCTURE: CPT | Mod: ORL | Performed by: NURSE PRACTITIONER

## 2022-08-24 PROCEDURE — P9604 ONE-WAY ALLOW PRORATED TRIP: HCPCS | Mod: ORL | Performed by: NURSE PRACTITIONER

## 2022-08-24 PROCEDURE — 82746 ASSAY OF FOLIC ACID SERUM: CPT | Mod: ORL | Performed by: NURSE PRACTITIONER

## 2022-12-19 NOTE — LETTER
12/19/2022        RE: Willi Gamez  [address]  [address]  [facility] [address]        [facility] GERIATRICS    PRIMARY CARE PROVIDER AND CLINIC:  Chris Crawford MD, [address] / [facility] [address]  Chief Complaint   Patient presents with     [facility] Medical Record Number:  [id number]  Place of Service where encounter took place:  [facility] (TCU) [[id number]]    Willi Gamez  is a 72 year old  (1950), admitted to the above facility from  [facility] . Hospital stay 12/07/2022 through 12/10/2022..   HPI:    Patient with PMH DM2, HTN, HLD, dementia and h/o TBI, etoh abuse and tobacco abuse also recent L SOFIA 12/8/22 admitted to TCU following hospitalization as above for mechanical fall and subsequent left hip dislocation s/p reduction 12/12. Xrays revealed arthroplasty hardware in appropriate position. TCU was recommended.     Nursing reports no new concerns.    Today patient found in room sitting up in bed. Alert, calm, NAD. Reports mild pain to left hip. Reports slept ok. States appetite good and reports is moving bowels without issue. Also denies issues with voiding. Denies SOB, chest pain or dizziness. Denies craving cigarettes or need for Nicoderm at this time. Discussed hospitalization and rehab routines. Questions answered.     CODE STATUS/ADVANCE DIRECTIVES DISCUSSION:  No Order  CPR/Full code   ALLERGIES: No Known Allergies   PAST MEDICAL HISTORY: No past medical history on file.   PAST SURGICAL HISTORY:   has no past surgical history on file.  FAMILY HISTORY: family history is not on file.  SOCIAL HISTORY:     Patient's living condition: lives in an assisted living facility    Post Discharge Medication Reconciliation Status:   MED REC REQUIRED    Post Medication Reconciliation Status: discharge medications reconciled and changed, per note/orders       Current Outpatient Medications   Medication Sig     acetaminophen (TYLENOL) 325 MG tablet Take 1 tablet (325  "mg) by mouth every 4 hours as needed for mild pain     amLODIPine (NORVASC) 2.5 MG tablet Take 1 tablet (2.5 mg) by mouth daily     aspirin (ASA) 81 MG EC tablet Take 1 tablet (81 mg) by mouth daily for 22 days     citalopram (CELEXA) 20 MG tablet Take 1 tablet (20 mg) by mouth daily     divalproex sodium delayed-release (DEPAKOTE) 500 MG DR tablet Take 1 tablet (500 mg) by mouth daily     memantine (NAMENDA) 10 MG tablet Take 1 tablet (10 mg) by mouth 2 times daily     methocarbamol (ROBAXIN) 500 MG tablet Take 1 tablet (500 mg) by mouth 4 times daily as needed for muscle spasms     pravastatin (PRAVACHOL) 40 MG tablet Take 1 tablet (40 mg) by mouth daily     buPROPion (WELLBUTRIN SR) 150 MG 12 hr tablet [BUPROPION (WELLBUTRIN SR) 150 MG 12 HR TABLET] Take 150 mg by mouth every morning.     cyanocobalamin 1,000 mcg/mL injection [CYANOCOBALAMIN 1,000 MCG/ML INJECTION] Inject 1,000 mcg into the shoulder, thigh, or buttocks every 30 (thirty) days.     nicotine (NICODERM CQ) 21 mg/24 hr [NICOTINE (NICODERM CQ) 21 MG/24 HR] Place 1 patch on the skin daily.     oxyCODONE (ROXICODONE) 5 MG tablet Take 1 tablet (5 mg) by mouth every 4 hours as needed for moderate to severe pain     No current facility-administered medications for this visit.       ROS:  10 point ROS of systems including Constitutional, Eyes, Respiratory, Cardiovascular, Gastroenterology, Genitourinary, Integumentary, Musculoskeletal, Psychiatric were all negative except for pertinent positives noted in my HPI.    Vitals:  /72   Pulse 86   Temp 97.5  F (36.4  C)   Resp 18   Ht 1.905 m (6' 3\")   Wt 95.3 kg (210 lb)   SpO2 96%   BMI 26.25 kg/m    Exam:    GENERAL APPEARANCE: Alert, in no distress   ENT: Mouth and posterior oropharynx normal, moist mucous membranes   EYES: EOM, conjunctivae, lids, pupils and irises normal   NECK: No adenopathy,masses or thyromegaly   RESP: respiratory effort and palpation of chest normal, Lungs clear to " auscultation  CV: VS as above. No edema noted  ABDOMEN: normal bowel sounds, soft, nontender, no hepatosplenomegaly or other masses   : palpation of bladder WNL   M/S: Gait and station normal   Digits and nails normal - GARCIA  SKIN: Inspection of skin and subcutaneous tissue baseline, Palpation of skin and subcutaneous tissue baseline, 2 surgical incisions to left hip, well approximated with steri-strips. No erythema noted.   NEURO: Cranial nerves 2-12 are grossly at patient's baseline, Examination of sensation by touch normal   PSYCH: oriented X 3, affect and mood normal             Lab/Diagnostic data:  Recent labs in [facility] reviewed by me today.     ASSESSMENT/PLAN:    Anterior dislocation of left hip, subsequent encounter  - s/p anterior reduction 12/12. Minimal discomfort currently  - maintain left anterior hip precautions  - continue acetaminophen (TYLENOL) 325 MG tablet; Take 1 tablet (325 mg) by mouth every 4 hours as needed for mild pain, methocarbamol (ROBAXIN) 500 MG tablet; Take 1 tablet (500 mg) by mouth 4 times daily as needed for muscle spasms  - continue prn oxycodone  Closed fracture of neck of left femur with routine healing, subsequent encounter  - s/p LTHA 12/8. Incision x 2 healing without issue  - keep surgical incisions clean and dry and monitor for healing  - WBAT  - continue methocarbamol (ROBAXIN) 500 MG tablet; Take 1 tablet (500 mg) by mouth 4 times daily as needed for muscle spasms  - aspirin (ASA) 81 MG EC tablet; Take 1 tablet (81 mg) by mouth daily for 22 days (done 1/14) for DVT prophylaxis  - F/w ortho 6 weeks postop as directed  Falls frequently  Physical deconditioning  - multifactorial, cognitive impairment, back to back hospitalization  - resides in A/L  - PT/OT to optimize function, safety and independence  - Supportive cares and treatments  - ongoing discharge planning, SW follow and care conferences per unit protocol    Leukocytosis, unspecified type  - noted during first  hospitalization. No noted s/s infection. Afebrile  - complete Duracef course  - recheck CBC to ensure stability    Primary hypertension  - chronic, limited data  - continue on amLODIPine (NORVASC) 2.5 MG tablet; Take 1 tablet (2.5 mg) by mouth daily and on Coreg  - monitor VS  Hyperlipidemia LDL goal <100  By history  - continue on pravastatin (PRAVACHOL) 40 MG tablet; Take 1 tablet (40 mg) by mouth daily    Tobacco abuse  - noted. Patient denies need for nicoderm patch currently.  - encourage cessation (patient desirous of) and provide resources prior to discharge    Type 2 diabetes mellitus without complication, without longterm current use of insulin  -   Lab Results   Component Value Date    A1C 6.7 08/17/2022    A1C 5.9 04/27/2022    A1C 6.4 12/08/2021    A1C 6.4 06/02/2021    A1C 6.6 01/27/2021     - limited data currently  - continue on metformin   - monitor BID BGL    Cognitive impairment  - noted. Resides in A/L. Appropriate in conversation today  - OT - cog screen  - continue on memantine (NAMENDA) 10 MG tablet; Take 1 tablet (10 mg) by mouth 2 times daily  - is also on Depakote HS  History of traumatic brain injury  noted  Vitamin B 12 deficiency   by history  - continues on B12 supplementation  Depression, unspecified depression type  - chronic, mood stable today  - continue on ctalopram (CELEXA) 20 MG tablet; Take 1 tablet (20 mg) by mouth daily-and on Wellbutrin  - monitor mood and behaviors  - supportive approach  ACP prn        Electronically signed by:  MARTIN Lara CNP                     Sincerely,        MARTIN Lara CNP

## 2022-12-19 NOTE — PROGRESS NOTES
[facility] GERIATRICS    PRIMARY CARE PROVIDER AND CLINIC:  Chris Crawford MD, [address] [facility] / [facility] [address]  Chief Complaint   Patient presents with     [facility] Medical Record Number:  [id number]  Place of Service where encounter took place:  [facility] (U) [[id number]]    Willi Gamez  is a 72 year old  (1950), admitted to the above facility from  [facility] . Hospital stay 12/07/2022 through 12/10/2022..   HPI:    Patient with PMH DM2, HTN, HLD, dementia and h/o TBI, etoh abuse and tobacco abuse also recent L SOFIA 12/8/22 admitted to TCU following hospitalization as above for mechanical fall and subsequent left hip dislocation s/p reduction 12/12. Xrays revealed arthroplasty hardware in appropriate position. TCU was recommended.     Nursing reports no new concerns.    Today patient found in room sitting up in bed. Alert, calm, NAD. Reports mild pain to left hip. Reports slept ok. States appetite good and reports is moving bowels without issue. Also denies issues with voiding. Denies SOB, chest pain or dizziness. Denies craving cigarettes or need for Nicoderm at this time. Discussed hospitalization and rehab routines. Questions answered.     CODE STATUS/ADVANCE DIRECTIVES DISCUSSION:  No Order  CPR/Full code   ALLERGIES: No Known Allergies   PAST MEDICAL HISTORY: No past medical history on file.   PAST SURGICAL HISTORY:   has no past surgical history on file.  FAMILY HISTORY: family history is not on file.  SOCIAL HISTORY:     Patient's living condition: lives in an assisted living facility    Post Discharge Medication Reconciliation Status:   MED REC REQUIRED    Post Medication Reconciliation Status: discharge medications reconciled and changed, per note/orders       Current Outpatient Medications   Medication Sig     acetaminophen (TYLENOL) 325 MG tablet Take 1 tablet (325 mg) by mouth every 4 hours as needed for mild pain     amLODIPine (NORVASC) 2.5 MG tablet Take 1  "tablet (2.5 mg) by mouth daily     aspirin (ASA) 81 MG EC tablet Take 1 tablet (81 mg) by mouth daily for 22 days     citalopram (CELEXA) 20 MG tablet Take 1 tablet (20 mg) by mouth daily     divalproex sodium delayed-release (DEPAKOTE) 500 MG DR tablet Take 1 tablet (500 mg) by mouth daily     memantine (NAMENDA) 10 MG tablet Take 1 tablet (10 mg) by mouth 2 times daily     methocarbamol (ROBAXIN) 500 MG tablet Take 1 tablet (500 mg) by mouth 4 times daily as needed for muscle spasms     pravastatin (PRAVACHOL) 40 MG tablet Take 1 tablet (40 mg) by mouth daily     buPROPion (WELLBUTRIN SR) 150 MG 12 hr tablet [BUPROPION (WELLBUTRIN SR) 150 MG 12 HR TABLET] Take 150 mg by mouth every morning.     cyanocobalamin 1,000 mcg/mL injection [CYANOCOBALAMIN 1,000 MCG/ML INJECTION] Inject 1,000 mcg into the shoulder, thigh, or buttocks every 30 (thirty) days.     nicotine (NICODERM CQ) 21 mg/24 hr [NICOTINE (NICODERM CQ) 21 MG/24 HR] Place 1 patch on the skin daily.     oxyCODONE (ROXICODONE) 5 MG tablet Take 1 tablet (5 mg) by mouth every 4 hours as needed for moderate to severe pain     No current facility-administered medications for this visit.       ROS:  10 point ROS of systems including Constitutional, Eyes, Respiratory, Cardiovascular, Gastroenterology, Genitourinary, Integumentary, Musculoskeletal, Psychiatric were all negative except for pertinent positives noted in my HPI.    Vitals:  /72   Pulse 86   Temp 97.5  F (36.4  C)   Resp 18   Ht 1.905 m (6' 3\")   Wt 95.3 kg (210 lb)   SpO2 96%   BMI 26.25 kg/m    Exam:    GENERAL APPEARANCE: Alert, in no distress   ENT: Mouth and posterior oropharynx normal, moist mucous membranes   EYES: EOM, conjunctivae, lids, pupils and irises normal   NECK: No adenopathy,masses or thyromegaly   RESP: respiratory effort and palpation of chest normal, Lungs clear to auscultation  CV: VS as above. No edema noted  ABDOMEN: normal bowel sounds, soft, nontender, no " hepatosplenomegaly or other masses   : palpation of bladder WNL   M/S: Gait and station normal   Digits and nails normal - GARCIA  SKIN: Inspection of skin and subcutaneous tissue baseline, Palpation of skin and subcutaneous tissue baseline, 2 surgical incisions to left hip, well approximated with steri-strips. No erythema noted.   NEURO: Cranial nerves 2-12 are grossly at patient's baseline, Examination of sensation by touch normal   PSYCH: oriented X 3, affect and mood normal             Lab/Diagnostic data:  Recent labs in [facility] reviewed by me today.     ASSESSMENT/PLAN:    Anterior dislocation of left hip, subsequent encounter  - s/p anterior reduction 12/12. Minimal discomfort currently  - maintain left anterior hip precautions  - continue acetaminophen (TYLENOL) 325 MG tablet; Take 1 tablet (325 mg) by mouth every 4 hours as needed for mild pain, methocarbamol (ROBAXIN) 500 MG tablet; Take 1 tablet (500 mg) by mouth 4 times daily as needed for muscle spasms  - continue prn oxycodone  Closed fracture of neck of left femur with routine healing, subsequent encounter  - s/p LTHA 12/8. Incision x 2 healing without issue  - keep surgical incisions clean and dry and monitor for healing  - WBAT  - continue methocarbamol (ROBAXIN) 500 MG tablet; Take 1 tablet (500 mg) by mouth 4 times daily as needed for muscle spasms  - aspirin (ASA) 81 MG EC tablet; Take 1 tablet (81 mg) by mouth daily for 22 days (done 1/14) for DVT prophylaxis  - F/w ortho 6 weeks postop as directed  Falls frequently  Physical deconditioning  - multifactorial, cognitive impairment, back to back hospitalization  - resides in A/L  - PT/OT to optimize function, safety and independence  - Supportive cares and treatments  - ongoing discharge planning, SW follow and care conferences per unit protocol    Leukocytosis, unspecified type  - noted during first hospitalization. No noted s/s infection. Afebrile  - complete Duracef course  - recheck CBC to ensure  stability    Primary hypertension  - chronic, limited data  - continue on amLODIPine (NORVASC) 2.5 MG tablet; Take 1 tablet (2.5 mg) by mouth daily and on Coreg  - monitor VS  Hyperlipidemia LDL goal <100  By history  - continue on pravastatin (PRAVACHOL) 40 MG tablet; Take 1 tablet (40 mg) by mouth daily    Tobacco abuse  - noted. Patient denies need for nicoderm patch currently.  - encourage cessation (patient desirous of) and provide resources prior to discharge    Type 2 diabetes mellitus without complication, without longterm current use of insulin  -   Lab Results   Component Value Date    A1C 6.7 08/17/2022    A1C 5.9 04/27/2022    A1C 6.4 12/08/2021    A1C 6.4 06/02/2021    A1C 6.6 01/27/2021     - limited data currently  - continue on metformin   - monitor BID BGL    Cognitive impairment  - noted. Resides in A/L. Appropriate in conversation today  - OT - cog screen  - continue on memantine (NAMENDA) 10 MG tablet; Take 1 tablet (10 mg) by mouth 2 times daily  - is also on Depakote HS  History of traumatic brain injury  noted  Vitamin B 12 deficiency   by history  - continues on B12 supplementation  Depression, unspecified depression type  - chronic, mood stable today  - continue on ctalopram (CELEXA) 20 MG tablet; Take 1 tablet (20 mg) by mouth daily-and on Wellbutrin  - monitor mood and behaviors  - supportive approach  ACP prn        Electronically signed by:  MARTIN Lara CNP

## 2022-12-20 NOTE — TELEPHONE ENCOUNTER
[facility] Triage Nurse Telephone Encounter    Provider: MARTIN Lara CNP   Facility: [facility] Facility Type:  TCU      Call Back Number: [phone]    Allergies:  No Known Allergies     Reason for call: New orders per NP.      Verbal Order/Direction given by Provider: Check CBC on 12/26/22.      Provider giving Order:  MARTIN Lara CNP     Verbal Order given to: Mookie Lima RN

## 2022-12-26 NOTE — PROGRESS NOTES
[facility] GERIATRICS DISCHARGE SUMMARY  PATIENT'S NAME: Willi Gamez  YOB: 1950  MEDICAL RECORD NUMBER:  [id number]  Place of Service where encounter took place:  [facility] (U) [[id number]]    PRIMARY CARE PROVIDER AND CLINIC RESPONSIBLE AFTER TRANSFER:   Chris Crawford MD, [address] / [facility] [address]    Assisted Living Facility     Transferring providers: Amanda Doherty, MARTIN GARCIA, Ella Reza MD  Recent Hospitalization/ED:  Hospital  [facility]  stay 12/7/2022 to 12/10/2022  Date of SNF Admission: December 10, 2022  Date of SNF (anticipated) Discharge: December 29, 2022  Discharged to: previous assisted living  Cognitive Scores: SLUMS 19/30  Physical Function: Ambulating 200 feet with FWW with SBA. Transfers modified independent. Upper body dressing set up. Lower body dressing modified ind. Grooming and hygiene SBA    CODE STATUS/ADVANCE DIRECTIVES DISCUSSION:  No Order     ALLERGIES: Patient has no known allergies.    NURSING FACILITY COURSE   Medication Changes/Rationale:     As outlined    Summary of nursing facility stay:     Patient with PMH DM2, HTN, HLD, dementia and h/o TBI, etoh abuse and tobacco abuse also recent L SOFIA 12/8/22 admitted to TCU following hospitalization as above for mechanical fall and subsequent left hip dislocation s/p reduction 12/12. Xrays revealed arthroplasty hardware in appropriate position. TCU was recommended.     Patient has been working with rehab therapists without issue and made good functional gains. Discharge functional status as above. Has remained medically stable.     Nursing reports no new concerns today.    Patient found in room walking out of the bathroom. Alert, calm, NAD. Report w/e went well and reports thinks is doing well and getting stronger. Reports minimal pain to RLE and states uses oxycodone only once in a while and this adequately relieves pain. Reports is eating well and moving bowels without issue. Reports no  problems noted with voiding.     VS and weights reviewed in facility EMR.                    Issues addressed in TCU:    Anterior dislocation of left hip, subsequent encounter  - s/p anterior reduction 12/12. Minimal discomfort currently  - maintain left anterior hip precautions  - continue acetaminophen (TYLENOL) 325 MG tablet; Take 1 tablet (325 mg) by mouth every 4 hours as needed for mild pain, methocarbamol (ROBAXIN) 500 MG tablet; Take 1 tablet (500 mg) by mouth 4 times daily as needed for muscle spasms  - continue prn oxycodone  Closed fracture of neck of left femur with routine healing, subsequent encounter  - s/p LTHA 12/8. Incision x 2 healing without issue  - keep surgical incisions clean and dry and monitor for healing  - WBAT  - continue methocarbamol (ROBAXIN) 500 MG tablet; Take 1 tablet (500 mg) by mouth 4 times daily as needed for muscle spasms  - aspirin (ASA) 81 MG EC tablet; Take 1 tablet (81 mg) by mouth BID for 28 days (done 1/14) for DVT prophylaxis and then back to 81 mg daily  - F/w ortho 6 weeks postop as directed  Falls frequently  Physical deconditioning  - multifactorial, cognitive impairment, back to back hospitalization  - resides in A/L and returning there  - home PT/OT to optimize function, safety and independence  - Supportive cares and services in A/L       Leukocytosis, unspecified type  - noted during first hospitalization. Trending down. No evidence of infection noted.  - completed Duracef course  - recheck periodic CBC to ensure stability     Primary hypertension  - chronic, controlled  - continue on amLODIPine (NORVASC) 2.5 MG tablet; Take 1 tablet (2.5 mg) by mouth daily and on Coreg  - monitor VS  Hyperlipidemia LDL goal <100  By history  - continue on pravastatin (PRAVACHOL) 40 MG tablet; Take 1 tablet (40 mg) by mouth daily     Tobacco abuse  - noted. Declined nicotine patch  - encourage cessation (patient desirous of) and provide resources      Type 2 diabetes mellitus  without complication, without longterm current use of insulin  -         Lab Results   Component Value Date     A1C 6.7 08/17/2022     A1C 5.9 04/27/2022     A1C 6.4 12/08/2021     A1C 6.4 06/02/2021     A1C 6.6 01/27/2021      - controlled  - continue on metformin   - monitor BID BGL     Cognitive impairment  - noted. Resides in A/L with plans to return there.  - OT - cog screen scores as above  - continue on memantine (NAMENDA) 10 MG tablet; Take 1 tablet (10 mg) by mouth 2 times daily  - is also on Depakote HS  History of traumatic brain injury  noted  Vitamin B 12 deficiency   by history  - continues on B12 supplementation  Depression, unspecified depression type  - chronic,mood stable, bright today  - continue on ctalopram (CELEXA) 20 MG tablet; Take 1 tablet (20 mg) by mouth daily-and on Wellbutrin  - monitor mood and behaviors  - supportive approach               Discharge Medications:  MED REC REQUIRED    Post Medication Reconciliation Status: discharge medications reconciled and changed, per note/orders         Current Outpatient Medications   Medication Sig Dispense Refill     acetaminophen (TYLENOL) 325 MG tablet Take 1 tablet (325 mg) by mouth every 4 hours as needed for mild pain       amLODIPine (NORVASC) 2.5 MG tablet Take 1 tablet (2.5 mg) by mouth daily       aspirin (ASA) 81 MG EC tablet Take 1 tablet (81 mg) by mouth daily for 22 days 22 tablet 0     buPROPion (WELLBUTRIN SR) 150 MG 12 hr tablet [BUPROPION (WELLBUTRIN SR) 150 MG 12 HR TABLET] Take 150 mg by mouth every morning.       citalopram (CELEXA) 20 MG tablet Take 1 tablet (20 mg) by mouth daily       cyanocobalamin 1,000 mcg/mL injection [CYANOCOBALAMIN 1,000 MCG/ML INJECTION] Inject 1,000 mcg into the shoulder, thigh, or buttocks every 30 (thirty) days.       divalproex sodium delayed-release (DEPAKOTE) 500 MG DR tablet Take 1 tablet (500 mg) by mouth daily       memantine (NAMENDA) 10 MG tablet Take 1 tablet (10 mg) by mouth 2 times  "daily       metFORMIN (GLUCOPHAGE XR) 500 MG 24 hr tablet Take 2 tablets (1,000 mg) by mouth daily (with dinner)       methocarbamol (ROBAXIN) 500 MG tablet Take 1 tablet (500 mg) by mouth 4 times daily as needed for muscle spasms       oxyCODONE (ROXICODONE) 5 MG tablet Take 1 tablet (5 mg) by mouth every 4 hours as needed for moderate to severe pain 30 tablet 0     pravastatin (PRAVACHOL) 40 MG tablet Take 1 tablet (40 mg) by mouth daily            Controlled medications:   May send with remaining oxycodone tabs     Past Medical History: No past medical history on file.  Physical Exam:   Vitals: /77   Pulse 62   Temp 97.2  F (36.2  C)   Resp 18   Ht 1.753 m (5' 9\")   Wt 88 kg (194 lb)   SpO2 97%   BMI 28.65 kg/m    BMI: Body mass index is 28.65 kg/m .    Resp: Effort WNL, LSCTA   CV: VS as above, trace LE edema noted  >  right  Abd- soft, nontender, BS +  Musc- GARCIA  Psych- alert, calm, pleasant      SNF labs:   Most Recent 3 CBC's:  Recent Labs   Lab Test 12/26/22  0612 12/20/22  0850 08/17/22  0735   WBC 12.1* 13.4* 7.8   HGB 12.9* 12.7* 15.0   * 103* 104*   * 507* 231     Most Recent 3 BMP's:  Recent Labs   Lab Test 12/20/22  0850 08/17/22  0735 05/07/22  1809    135* 138   POTASSIUM 3.7 4.3 3.9   CHLORIDE 103 100 103   CO2 22 17* 22   BUN 17.2 11.7 6*   CR 0.85 0.87 0.95   ANIONGAP 12 18* 13   CHAITANYA 8.5* 9.1 9.1   * 148* 155*       DISCHARGE PLAN:    Follow up labs: No labs orders/due    Medical Follow Up:      Follow up with primary care provider in 1 weeks  Follow up with specialist as above     Discharge Services: Home Care:  Occupational Therapy, Physical Therapy, Registered Nurse and Home Health Aide    TOTAL DISCHARGE TIME:   Greater than 30 minutes  Electronically signed by:  MARTIN Lara CNP     Documentation of Face to Face and Certification for Home Health Services    I certify that patient: Willi Gamez is under my care and that I, or a nurse " practitioner or physician's assistant working with me, had a face-to-face encounter that meets the physician face-to-face encounter requirements with this patient on: 12/26/2022.    This encounter with the patient was in whole, or in part, for the following medical condition, which is the primary reason for home health care: as above.    I certify that, based on my findings, the following services are medically necessary home health services: Nursing, Occupational Therapy and Physical Therapy.    My clinical findings support the need for the above services because: Nurse is needed: To assess VS, incisions/healing, pain, med teaching after changes in medications or other medical regimen.., Occupational Therapy Services are needed to assess and treat cognitive ability and address ADL safety due to impairment in difficulty with MRADLs. and Physical Therapy Services are needed to assess and treat the following functional impairments: difficulty walking.    Further, I certify that my clinical findings support that this patient is homebound (i.e. absences from home require considerable and taxing effort and are for medical reasons or [employer] services or infrequently or of short duration when for other reasons) because: Requires assistance of another person or specialized equipment to access medical services because patient: Requires supervision of another for safe transfer...    Based on the above findings. I certify that this patient is confined to the home and needs intermittent skilled nursing care, physical therapy and/or speech therapy.  The patient is under my care, and I have initiated the establishment of the plan of care.  This patient will be followed by a physician who will periodically review the plan of care.  Physician/Provider to provide follow up care: Chris Crawford    Attending hospital physician (the Medicare certified [facility] provider): MARTIN Lara CNP  Physician Signature: See  electronic signature associated with these discharge orders.  Date: 12/26/2022

## 2022-12-26 NOTE — LETTER
12/26/2022        RE: Willi Gamez  [address]  [address]  [facility] [address]        [facility] GERIATRICS DISCHARGE SUMMARY  PATIENT'S NAME: Willi Gamez  YOB: 1950  MEDICAL RECORD NUMBER:  [id number]  Place of Service where encounter took place:  [facility] ([facility]) [[id number]]    PRIMARY CARE PROVIDER AND CLINIC RESPONSIBLE AFTER TRANSFER:   Chris Crawford MD, [address] / [facility] [address]    Assisted Living Facility     Transferring providers: Amanda Doherty, MARTIN GARCIA, Ella Reza MD  Recent Hospitalization/ED:  Hospital  [facility]  stay 12/7/2022 to 12/10/2022  Date of SNF Admission: December 10, 2022  Date of SNF (anticipated) Discharge: December 29, 2022  Discharged to: previous assisted living  Cognitive Scores: SLUMS 19/30  Physical Function: Ambulating 200 feet with FWW with SBA. Transfers modified independent. Upper body dressing set up. Lower body dressing modified ind. Grooming and hygiene SBA    CODE STATUS/ADVANCE DIRECTIVES DISCUSSION:  No Order     ALLERGIES: Patient has no known allergies.    NURSING FACILITY COURSE   Medication Changes/Rationale:     As outlined    Summary of nursing facility stay:     Patient with PMH DM2, HTN, HLD, dementia and h/o TBI, etoh abuse and tobacco abuse also recent L SOFIA 12/8/22 admitted to TCU following hospitalization as above for mechanical fall and subsequent left hip dislocation s/p reduction 12/12. Xrays revealed arthroplasty hardware in appropriate position. TCU was recommended.     Patient has been working with rehab therapists without issue and made good functional gains. Discharge functional status as above. Has remained medically stable.     Nursing reports no new concerns today.    Patient found in room walking out of the bathroom. Alert, calm, NAD. Report w/e went well and reports thinks is doing well and getting stronger. Reports minimal pain to RLE and states uses oxycodone only once in a while and this  adequately relieves pain. Reports is eating well and moving bowels without issue. Reports no problems noted with voiding.     VS and weights reviewed in facility EMR.                    Issues addressed in TCU:    Anterior dislocation of left hip, subsequent encounter  - s/p anterior reduction 12/12. Minimal discomfort currently  - maintain left anterior hip precautions  - continue acetaminophen (TYLENOL) 325 MG tablet; Take 1 tablet (325 mg) by mouth every 4 hours as needed for mild pain, methocarbamol (ROBAXIN) 500 MG tablet; Take 1 tablet (500 mg) by mouth 4 times daily as needed for muscle spasms  - continue prn oxycodone  Closed fracture of neck of left femur with routine healing, subsequent encounter  - s/p LTHA 12/8. Incision x 2 healing without issue  - keep surgical incisions clean and dry and monitor for healing  - WBAT  - continue methocarbamol (ROBAXIN) 500 MG tablet; Take 1 tablet (500 mg) by mouth 4 times daily as needed for muscle spasms  - aspirin (ASA) 81 MG EC tablet; Take 1 tablet (81 mg) by mouth BID for 28 days (done 1/14) for DVT prophylaxis and then back to 81 mg daily  - F/w ortho 6 weeks postop as directed  Falls frequently  Physical deconditioning  - multifactorial, cognitive impairment, back to back hospitalization  - resides in A/L and returning there  - home PT/OT to optimize function, safety and independence  - Supportive cares and services in A/L       Leukocytosis, unspecified type  - noted during first hospitalization. Trending down. No evidence of infection noted.  - completed Duracef course  - recheck periodic CBC to ensure stability     Primary hypertension  - chronic, controlled  - continue on amLODIPine (NORVASC) 2.5 MG tablet; Take 1 tablet (2.5 mg) by mouth daily and on Coreg  - monitor VS  Hyperlipidemia LDL goal <100  By history  - continue on pravastatin (PRAVACHOL) 40 MG tablet; Take 1 tablet (40 mg) by mouth daily     Tobacco abuse  - noted. Declined nicotine patch  -  encourage cessation (patient desirous of) and provide resources      Type 2 diabetes mellitus without complication, without longterm current use of insulin  -         Lab Results   Component Value Date     A1C 6.7 08/17/2022     A1C 5.9 04/27/2022     A1C 6.4 12/08/2021     A1C 6.4 06/02/2021     A1C 6.6 01/27/2021      - controlled  - continue on metformin   - monitor BID BGL     Cognitive impairment  - noted. Resides in A/L with plans to return there.  - OT - cog screen scores as above  - continue on memantine (NAMENDA) 10 MG tablet; Take 1 tablet (10 mg) by mouth 2 times daily  - is also on Depakote HS  History of traumatic brain injury  noted  Vitamin B 12 deficiency   by history  - continues on B12 supplementation  Depression, unspecified depression type  - chronic,mood stable, bright today  - continue on ctalopram (CELEXA) 20 MG tablet; Take 1 tablet (20 mg) by mouth daily-and on Wellbutrin  - monitor mood and behaviors  - supportive approach               Discharge Medications:  MED REC REQUIRED    Post Medication Reconciliation Status: discharge medications reconciled and changed, per note/orders         Current Outpatient Medications   Medication Sig Dispense Refill     acetaminophen (TYLENOL) 325 MG tablet Take 1 tablet (325 mg) by mouth every 4 hours as needed for mild pain       amLODIPine (NORVASC) 2.5 MG tablet Take 1 tablet (2.5 mg) by mouth daily       aspirin (ASA) 81 MG EC tablet Take 1 tablet (81 mg) by mouth daily for 22 days 22 tablet 0     buPROPion (WELLBUTRIN SR) 150 MG 12 hr tablet [BUPROPION (WELLBUTRIN SR) 150 MG 12 HR TABLET] Take 150 mg by mouth every morning.       citalopram (CELEXA) 20 MG tablet Take 1 tablet (20 mg) by mouth daily       cyanocobalamin 1,000 mcg/mL injection [CYANOCOBALAMIN 1,000 MCG/ML INJECTION] Inject 1,000 mcg into the shoulder, thigh, or buttocks every 30 (thirty) days.       divalproex sodium delayed-release (DEPAKOTE) 500 MG DR tablet Take 1 tablet (500 mg) by  "mouth daily       memantine (NAMENDA) 10 MG tablet Take 1 tablet (10 mg) by mouth 2 times daily       metFORMIN (GLUCOPHAGE XR) 500 MG 24 hr tablet Take 2 tablets (1,000 mg) by mouth daily (with dinner)       methocarbamol (ROBAXIN) 500 MG tablet Take 1 tablet (500 mg) by mouth 4 times daily as needed for muscle spasms       oxyCODONE (ROXICODONE) 5 MG tablet Take 1 tablet (5 mg) by mouth every 4 hours as needed for moderate to severe pain 30 tablet 0     pravastatin (PRAVACHOL) 40 MG tablet Take 1 tablet (40 mg) by mouth daily            Controlled medications:   May send with remaining oxycodone tabs     Past Medical History: No past medical history on file.  Physical Exam:   Vitals: /77   Pulse 62   Temp 97.2  F (36.2  C)   Resp 18   Ht 1.753 m (5' 9\")   Wt 88 kg (194 lb)   SpO2 97%   BMI 28.65 kg/m    BMI: Body mass index is 28.65 kg/m .    Resp: Effort WNL, LSCTA   CV: VS as above, trace LE edema noted  >  right  Abd- soft, nontender, BS +  Musc- GARCIA  Psych- alert, calm, pleasant      SNF labs:   Most Recent 3 CBC's:  Recent Labs   Lab Test 12/26/22  0612 12/20/22  0850 08/17/22  0735   WBC 12.1* 13.4* 7.8   HGB 12.9* 12.7* 15.0   * 103* 104*   * 507* 231     Most Recent 3 BMP's:  Recent Labs   Lab Test 12/20/22  0850 08/17/22  0735 05/07/22  1809    135* 138   POTASSIUM 3.7 4.3 3.9   CHLORIDE 103 100 103   CO2 22 17* 22   BUN 17.2 11.7 6*   CR 0.85 0.87 0.95   ANIONGAP 12 18* 13   CHAITANYA 8.5* 9.1 9.1   * 148* 155*       DISCHARGE PLAN:    Follow up labs: No labs orders/due    Medical Follow Up:      Follow up with primary care provider in 1 weeks  Follow up with specialist as above     Discharge Services: Home Care:  Occupational Therapy, Physical Therapy, Registered Nurse and Home Health Aide    TOTAL DISCHARGE TIME:   Greater than 30 minutes  Electronically signed by:  MARTIN Lara CNP     Documentation of Face to Face and Certification for Home Health " Services    I certify that patient: Willi Gamez is under my care and that I, or a nurse practitioner or physician's assistant working with me, had a face-to-face encounter that meets the physician face-to-face encounter requirements with this patient on: 12/26/2022.    This encounter with the patient was in whole, or in part, for the following medical condition, which is the primary reason for home health care: as above.    I certify that, based on my findings, the following services are medically necessary home health services: Nursing, Occupational Therapy and Physical Therapy.    My clinical findings support the need for the above services because: Nurse is needed: To assess VS, incisions/healing, pain, med teaching after changes in medications or other medical regimen.., Occupational Therapy Services are needed to assess and treat cognitive ability and address ADL safety due to impairment in difficulty with MRADLs. and Physical Therapy Services are needed to assess and treat the following functional impairments: difficulty walking.    Further, I certify that my clinical findings support that this patient is homebound (i.e. absences from home require considerable and taxing effort and are for medical reasons or [employer] or infrequently or of short duration when for other reasons) because: Requires assistance of another person or specialized equipment to access medical services because patient: Requires supervision of another for safe transfer...    Based on the above findings. I certify that this patient is confined to the home and needs intermittent skilled nursing care, physical therapy and/or speech therapy.  The patient is under my care, and I have initiated the establishment of the plan of care.  This patient will be followed by a physician who will periodically review the plan of care.  Physician/Provider to provide follow up care: Chris Crawford    Attending hospital physician (the Medicare  certified [facility] provider): MARTIN Lara CNP  Physician Signature: See electronic signature associated with these discharge orders.  Date: 12/26/2022                  Sincerely,        MARTIN Lara CNP

## 2022-12-27 PROBLEM — E11.9 CONTROLLED TYPE 2 DIABETES MELLITUS WITHOUT COMPLICATION, WITHOUT LONG-TERM CURRENT USE OF INSULIN (H): Status: ACTIVE | Noted: 2022-01-01

## 2023-01-01 ENCOUNTER — LAB REQUISITION (OUTPATIENT)
Dept: LAB | Facility: CLINIC | Age: 73
End: 2023-01-01
Payer: COMMERCIAL

## 2023-01-01 ENCOUNTER — APPOINTMENT (OUTPATIENT)
Dept: CT IMAGING | Facility: HOSPITAL | Age: 73
End: 2023-01-01
Attending: EMERGENCY MEDICINE
Payer: COMMERCIAL

## 2023-01-01 ENCOUNTER — HOSPITAL ENCOUNTER (EMERGENCY)
Facility: HOSPITAL | Age: 73
Discharge: HOME | End: 2023-05-05
Attending: EMERGENCY MEDICINE | Admitting: EMERGENCY MEDICINE
Payer: COMMERCIAL

## 2023-01-01 ENCOUNTER — MEDICAL CORRESPONDENCE (OUTPATIENT)
Dept: HEALTH INFORMATION MANAGEMENT | Facility: CLINIC | Age: 73
End: 2023-01-01

## 2023-01-01 ENCOUNTER — HOSPITAL ENCOUNTER (OUTPATIENT)
Facility: CLINIC | Age: 73
Discharge: HOME | End: 2023-11-01
Admitting: INTERNAL MEDICINE
Payer: COMMERCIAL

## 2023-01-01 VITALS
DIASTOLIC BLOOD PRESSURE: 86 MMHG | OXYGEN SATURATION: 98 % | BODY MASS INDEX: 26.95 KG/M2 | RESPIRATION RATE: 18 BRPM | TEMPERATURE: 98.1 F | SYSTOLIC BLOOD PRESSURE: 140 MMHG | WEIGHT: 210 LBS | HEART RATE: 105 BPM | HEIGHT: 74 IN

## 2023-01-01 DIAGNOSIS — Z12.5 ENCOUNTER FOR SCREENING FOR MALIGNANT NEOPLASM OF PROSTATE: ICD-10-CM

## 2023-01-01 DIAGNOSIS — D75.89 OTHER SPECIFIED DISEASES OF BLOOD AND BLOOD-FORMING ORGANS: ICD-10-CM

## 2023-01-01 DIAGNOSIS — S00.93XA CONTUSION OF HEAD, UNSPECIFIED PART OF HEAD, INITIAL ENCOUNTER: ICD-10-CM

## 2023-01-01 DIAGNOSIS — E11.9 TYPE 2 DIABETES MELLITUS WITHOUT COMPLICATIONS (H): ICD-10-CM

## 2023-01-01 DIAGNOSIS — E11.00 TYPE 2 DIABETES MELLITUS WITH HYPEROSMOLARITY WITHOUT NONKETOTIC HYPERGLYCEMIC-HYPEROSMOLAR COMA (NKHHC) (H): ICD-10-CM

## 2023-01-01 DIAGNOSIS — F10.10 ALCOHOL ABUSE, UNCOMPLICATED: ICD-10-CM

## 2023-01-01 LAB
ACANTHOCYTES BLD QL SMEAR: ABNORMAL
ALBUMIN SERPL BCG-MCNC: 2.8 G/DL (ref 3.5–5.2)
ALBUMIN SERPL BCG-MCNC: 4 G/DL (ref 3.5–5.2)
ALP SERPL-CCNC: 107 U/L (ref 40–129)
ALP SERPL-CCNC: 268 U/L (ref 40–129)
ALT SERPL W P-5'-P-CCNC: 33 U/L (ref 0–70)
ALT SERPL W P-5'-P-CCNC: 45 U/L (ref 0–70)
ANION GAP SERPL CALCULATED.3IONS-SCNC: 15 MMOL/L (ref 7–15)
ANION GAP SERPL CALCULATED.3IONS-SCNC: 18 MMOL/L (ref 7–15)
ANION GAP SERPL CALCULATED.3IONS-SCNC: 22 MMOL/L (ref 7–15)
AST SERPL W P-5'-P-CCNC: 28 U/L (ref 0–45)
AST SERPL W P-5'-P-CCNC: 94 U/L (ref 0–45)
AUER BODIES BLD QL SMEAR: ABNORMAL
BASO STIPL BLD QL SMEAR: ABNORMAL
BILIRUB DIRECT SERPL-MCNC: 0.96 MG/DL (ref 0–0.3)
BILIRUB DIRECT SERPL-MCNC: <0.2 MG/DL (ref 0–0.3)
BILIRUB SERPL-MCNC: 0.4 MG/DL
BILIRUB SERPL-MCNC: 1.5 MG/DL
BITE CELLS BLD QL SMEAR: ABNORMAL
BLISTER CELLS BLD QL SMEAR: ABNORMAL
BUN SERPL-MCNC: 12.7 MG/DL (ref 8–23)
BUN SERPL-MCNC: 17.2 MG/DL (ref 8–23)
BUN SERPL-MCNC: 40.6 MG/DL (ref 8–23)
BURR CELLS BLD QL SMEAR: SLIGHT
CALCIUM SERPL-MCNC: 9 MG/DL (ref 8.8–10.2)
CALCIUM SERPL-MCNC: 9.1 MG/DL (ref 8.8–10.2)
CALCIUM SERPL-MCNC: 9.8 MG/DL (ref 8.8–10.2)
CHLORIDE SERPL-SCNC: 103 MMOL/L (ref 98–107)
CHLORIDE SERPL-SCNC: 107 MMOL/L (ref 98–107)
CHLORIDE SERPL-SCNC: 94 MMOL/L (ref 98–107)
CREAT SERPL-MCNC: 0.89 MG/DL (ref 0.67–1.17)
CREAT SERPL-MCNC: 0.92 MG/DL (ref 0.67–1.17)
CREAT SERPL-MCNC: 1.44 MG/DL (ref 0.67–1.17)
DACRYOCYTES BLD QL SMEAR: ABNORMAL
DEPRECATED HCO3 PLAS-SCNC: 18 MMOL/L (ref 22–29)
DEPRECATED HCO3 PLAS-SCNC: 18 MMOL/L (ref 22–29)
DEPRECATED HCO3 PLAS-SCNC: 19 MMOL/L (ref 22–29)
EGFRCR SERPLBLD CKD-EPI 2021: 52 ML/MIN/1.73M2
ELLIPTOCYTES BLD QL SMEAR: ABNORMAL
ERYTHROCYTE [DISTWIDTH] IN BLOOD BY AUTOMATED COUNT: 13.1 % (ref 10–15)
ERYTHROCYTE [DISTWIDTH] IN BLOOD BY AUTOMATED COUNT: 13.4 % (ref 10–15)
ERYTHROCYTE [DISTWIDTH] IN BLOOD BY AUTOMATED COUNT: 13.9 % (ref 10–15)
FRAGMENTS BLD QL SMEAR: ABNORMAL
GFR SERPL CREATININE-BSD FRML MDRD: 89 ML/MIN/1.73M2
GFR SERPL CREATININE-BSD FRML MDRD: >90 ML/MIN/1.73M2
GLUCOSE SERPL-MCNC: 152 MG/DL (ref 70–99)
GLUCOSE SERPL-MCNC: 170 MG/DL (ref 70–99)
GLUCOSE SERPL-MCNC: 178 MG/DL (ref 70–99)
HBA1C MFR BLD: 6.1 %
HBA1C MFR BLD: 7.1 %
HBA1C MFR BLD: 7.2 %
HCT VFR BLD AUTO: 40.6 % (ref 40–53)
HCT VFR BLD AUTO: 47.1 % (ref 40–53)
HCT VFR BLD AUTO: 49.3 % (ref 40–53)
HGB BLD-MCNC: 12.7 G/DL (ref 13.3–17.7)
HGB BLD-MCNC: 15.3 G/DL (ref 13.3–17.7)
HGB BLD-MCNC: 16.4 G/DL (ref 13.3–17.7)
HGB C CRYSTALS: ABNORMAL
HOWELL-JOLLY BOD BLD QL SMEAR: ABNORMAL
MCH RBC QN AUTO: 32.7 PG (ref 26.5–33)
MCH RBC QN AUTO: 34 PG (ref 26.5–33)
MCH RBC QN AUTO: 34.9 PG (ref 26.5–33)
MCHC RBC AUTO-ENTMCNC: 31.3 G/DL (ref 31.5–36.5)
MCHC RBC AUTO-ENTMCNC: 32.5 G/DL (ref 31.5–36.5)
MCHC RBC AUTO-ENTMCNC: 33.3 G/DL (ref 31.5–36.5)
MCV RBC AUTO: 102 FL (ref 78–100)
MCV RBC AUTO: 105 FL (ref 78–100)
MCV RBC AUTO: 108 FL (ref 78–100)
NEUTS HYPERSEG BLD QL SMEAR: ABNORMAL
PLAT MORPH BLD: ABNORMAL
PLATELET # BLD AUTO: 202 10E3/UL (ref 150–450)
PLATELET # BLD AUTO: 280 10E3/UL (ref 150–450)
PLATELET # BLD AUTO: 315 10E3/UL (ref 150–450)
POLYCHROMASIA BLD QL SMEAR: ABNORMAL
POTASSIUM SERPL-SCNC: 4.3 MMOL/L (ref 3.4–5.3)
POTASSIUM SERPL-SCNC: 4.4 MMOL/L (ref 3.4–5.3)
POTASSIUM SERPL-SCNC: 5.8 MMOL/L (ref 3.4–5.3)
PROT SERPL-MCNC: 6.5 G/DL (ref 6.4–8.3)
PROT SERPL-MCNC: 6.6 G/DL (ref 6.4–8.3)
PSA SERPL DL<=0.01 NG/ML-MCNC: 40.8 NG/ML (ref 0–6.5)
RBC # BLD AUTO: 3.88 10E6/UL (ref 4.4–5.9)
RBC # BLD AUTO: 4.38 10E6/UL (ref 4.4–5.9)
RBC # BLD AUTO: 4.82 10E6/UL (ref 4.4–5.9)
RBC AGGLUT BLD QL: ABNORMAL
RBC MORPH BLD: ABNORMAL
ROULEAUX BLD QL SMEAR: ABNORMAL
SICKLE CELLS BLD QL SMEAR: ABNORMAL
SMUDGE CELLS BLD QL SMEAR: ABNORMAL
SODIUM SERPL-SCNC: 134 MMOL/L (ref 135–145)
SODIUM SERPL-SCNC: 140 MMOL/L (ref 136–145)
SODIUM SERPL-SCNC: 140 MMOL/L (ref 136–145)
SPHEROCYTES BLD QL SMEAR: ABNORMAL
STOMATOCYTES BLD QL SMEAR: ABNORMAL
TARGETS BLD QL SMEAR: ABNORMAL
TOXIC GRANULES BLD QL SMEAR: ABNORMAL
TSH SERPL DL<=0.005 MIU/L-ACNC: 2.51 UIU/ML (ref 0.3–4.2)
VARIANT LYMPHS BLD QL SMEAR: ABNORMAL
WBC # BLD AUTO: 21.5 10E3/UL (ref 4–11)
WBC # BLD AUTO: 8.7 10E3/UL (ref 4–11)
WBC # BLD AUTO: 9.8 10E3/UL (ref 4–11)

## 2023-01-01 PROCEDURE — 36415 COLL VENOUS BLD VENIPUNCTURE: CPT | Mod: ORL | Performed by: NURSE PRACTITIONER

## 2023-01-01 PROCEDURE — 36415 COLL VENOUS BLD VENIPUNCTURE: CPT | Mod: ORL

## 2023-01-01 PROCEDURE — 82248 BILIRUBIN DIRECT: CPT | Mod: ORL

## 2023-01-01 PROCEDURE — 80048 BASIC METABOLIC PNL TOTAL CA: CPT | Mod: ORL | Performed by: NURSE PRACTITIONER

## 2023-01-01 PROCEDURE — 72125 CT NECK SPINE W/O DYE: CPT

## 2023-01-01 PROCEDURE — P9603 ONE-WAY ALLOW PRORATED MILES: HCPCS | Mod: ORL | Performed by: NURSE PRACTITIONER

## 2023-01-01 PROCEDURE — G0103 PSA SCREENING: HCPCS | Mod: ORL

## 2023-01-01 PROCEDURE — 85027 COMPLETE CBC AUTOMATED: CPT | Mod: ORL

## 2023-01-01 PROCEDURE — 99284 EMERGENCY DEPT VISIT MOD MDM: CPT | Mod: 25

## 2023-01-01 PROCEDURE — 85027 COMPLETE CBC AUTOMATED: CPT | Mod: ORL | Performed by: NURSE PRACTITIONER

## 2023-01-01 PROCEDURE — P9604 ONE-WAY ALLOW PRORATED TRIP: HCPCS | Mod: ORL

## 2023-01-01 PROCEDURE — 70450 CT HEAD/BRAIN W/O DYE: CPT

## 2023-01-01 PROCEDURE — 83036 HEMOGLOBIN GLYCOSYLATED A1C: CPT | Mod: ORL | Performed by: NURSE PRACTITIONER

## 2023-01-01 PROCEDURE — 84443 ASSAY THYROID STIM HORMONE: CPT | Mod: ORL | Performed by: NURSE PRACTITIONER

## 2023-01-01 PROCEDURE — 12001 RPR S/N/AX/GEN/TRNK 2.5CM/<: CPT

## 2023-01-01 PROCEDURE — 83036 HEMOGLOBIN GLYCOSYLATED A1C: CPT | Mod: ORL

## 2023-01-01 PROCEDURE — 80076 HEPATIC FUNCTION PANEL: CPT | Mod: ORL | Performed by: NURSE PRACTITIONER

## 2023-01-01 PROCEDURE — P9604 ONE-WAY ALLOW PRORATED TRIP: HCPCS | Mod: ORL | Performed by: NURSE PRACTITIONER

## 2023-01-01 PROCEDURE — 80053 COMPREHEN METABOLIC PANEL: CPT | Mod: ORL

## 2023-01-01 ASSESSMENT — ACTIVITIES OF DAILY LIVING (ADL): ADLS_ACUITY_SCORE: 35

## 2023-05-05 NOTE — ED TRIAGE NOTES
Patient states he had a few screwdrivers this morning. Patient walked to [employer]. When patient arrived at his Independent Living facility, patient stepped onto curb, loss footing and fell backwards striking head on pavement. No LOC. Patient  Negative for thinners.

## 2023-05-05 NOTE — DISCHARGE INSTRUCTIONS
Fortunately you do not appear to have suffered any serious injuries from your fall today.  Your scalp laceration was repaired with a staple.  Follow-up with your primary care doctor in the next 5 to 7 days for staple removal.  Return to the ER for any worsening symptoms or other concerns.

## 2023-05-05 NOTE — ED PROVIDER NOTES
EMERGENCY DEPARTMENT ENCOUnter      NAME: Willi Gamez  AGE: 73 year old male  YOB: 1950  MRN: [id number]  EVALUATION DATE & TIME: No admission date for patient encounter.    PCP: Chris Crawford    ED PROVIDER: Luis Barfield DO      Chief Complaint   Patient presents with     Fall         FINAL IMPRESSION:  1. Contusion of head, unspecified part of head, initial encounter          ED COURSE & MEDICAL DECISION MAKIN:49 PM I met with the patient, obtained history, performed an initial exam, and discussed options and plan for diagnostics and treatment here in the ED.    The patient presented to the emergency department today after falling.  It was a mechanical fall and he struck the back of his head.  He did not lose consciousness.  He is not on blood thinners.  He was found of a small laceration on the back of his head which was repaired with a single staple.  CT of the head and neck was performed which were unremarkable.  Plan will be for discharge home with outpatient follow-up.  He is comfortable with this plan.      Medical Decision Making    History:    Supplemental history from: EMS    External Record(s) reviewed: Documented in chart, if applicable.    Work Up:    Chart documentation includes differential considered and any EKGs or imaging independently interpreted by provider, where specified.    In additional to work up documented, I considered the following work up: Documented in chart, if applicable.    External consultation:    Discussion of management with another provider: Documented in chart, if applicable    Complicating factors:    Care impacted by chronic illness: Diabetes and Hyperlipidemia    Care affected by social determinants of health: Alcohol Abuse and/or Recreational Drug Use    Disposition considerations: Discharge. No recommendations on prescription strength medication(s). N/A.        At the conclusion of the encounter I discussed the results of all of the tests and  the disposition. The questions were answered. The patient or family acknowledged understanding and was agreeable with the care plan.         =================================================================    HPI        Willi Gamez is a 73 year old male with a pertinent history of alcohol use, Korsakoff syndrome, diabetes mellitus type 2, hyperlipidemia who presents to this ED by EMS for evaluation of alcohol intoxication. Per EMS, the patient was drinking earlier today and was taking his first step up a set of stairs when he accidentally fell backwards and hit the back of his head. no loss of consciousness, patient states he remembers the entire incident. Patient is not on anticoagulants. He was ambulatory on EMS arrival. No neck pain or back pain, nausea, vomiting. No other reported complaints at this time. Patient has blood on the back of his head during the initial patient encounter.       REVIEW OF SYSTEMS     Constitutional:  Positive for alcohol intoxication. Denies fever or chills  HENT: Positive of pain to back of head. Denies sore throat   Respiratory:  Denies cough or shortness of breath   Cardiovascular:  Denies chest pain or palpitations  GI:  Denies abdominal pain, nausea, or vomiting  Musculoskeletal:  Denies any new extremity pain, neck pain, back pain  Skin:  Denies rash   Neurologic:  Denies headache, focal weakness or sensory changes    All other systems reviewed and are negative      PAST MEDICAL HISTORY:  No past medical history on file.    PAST SURGICAL HISTORY:  No past surgical history on file.        CURRENT MEDICATIONS:    acetaminophen (TYLENOL) 325 MG tablet  amLODIPine (NORVASC) 2.5 MG tablet  buPROPion (WELLBUTRIN SR) 150 MG 12 hr tablet  citalopram (CELEXA) 20 MG tablet  cyanocobalamin 1,000 mcg/mL injection  divalproex sodium delayed-release (DEPAKOTE) 500 MG DR tablet  memantine (NAMENDA) 10 MG tablet  metFORMIN (GLUCOPHAGE XR) 500 MG 24 hr tablet  methocarbamol (ROBAXIN) 500 MG  "tablet  oxyCODONE (ROXICODONE) 5 MG tablet  pravastatin (PRAVACHOL) 40 MG tablet        ALLERGIES:  No Known Allergies    FAMILY HISTORY:  No family history on file.    SOCIAL HISTORY:   Social History     Socioeconomic History     Marital status:        VITALS:  Patient Vitals for the past 24 hrs:   BP Temp Temp src Pulse Resp SpO2 Height Weight   05/05/23 1730 (!) 140/86 -- -- 105 18 98 % -- --   05/05/23 1352 (!) 139/105 98.1  F (36.7  C) Oral 114 18 98 % 1.88 m (6' 2\") 95.3 kg (210 lb)       PHYSICAL EXAM    Constitutional:  Well developed, Well nourished,  HENT:  Normocephalic, Oropharynx moist, Nose normal. 1.5 cm angular laceration to the posterior scalp, no active bleeding, no other signs of traumatic injury.  Eyes:  EOMI, Conjunctiva normal, No discharge.   Respiratory:  Normal breath sounds, No respiratory distress, No wheezing, No chest tenderness.   Cardiovascular:  Normal heart rate, Normal rhythm, No murmurs  GI:  Soft, No tenderness, No guarding, No CVA tenderness.   Musculoskeletal:  No tenderness to palpation or major deformities noted.   Extremities: No lower extremity edema.  Neurologic:  Alert & oriented x 3, No focal deficits noted.   Psychiatric:  Affect normal, Judgment normal, Mood normal.        RADIOLOGY:  I have independently reviewed and interpreted the above imaging, pending the final radiology read.  CT Cervical Spine w/o Contrast   Final Result   IMPRESSION:   HEAD CT:   1.  No CT evidence for acute intracranial process.   2.  Brain atrophy and presumed chronic microvascular ischemic changes as above.   3.  Progressed opacification of the right maxillary sinus with associated inspissated mucus.      CERVICAL SPINE CT:   1.  No CT evidence for acute fracture or post traumatic subluxation.   2.  Unchanged chronic type II odontoid fracture and associated gas-filled fracture plane.   3.  Severe right C3-C4 and moderate right C5-C6 neural foraminal stenoses due to chronic " degeneration.      CT Head w/o Contrast   Final Result   IMPRESSION:   HEAD CT:   1.  No CT evidence for acute intracranial process.   2.  Brain atrophy and presumed chronic microvascular ischemic changes as above.   3.  Progressed opacification of the right maxillary sinus with associated inspissated mucus.      CERVICAL SPINE CT:   1.  No CT evidence for acute fracture or post traumatic subluxation.   2.  Unchanged chronic type II odontoid fracture and associated gas-filled fracture plane.   3.  Severe right C3-C4 and moderate right C5-C6 neural foraminal stenoses due to chronic degeneration.          PROCEDURES:  PROCEDURE: Laceration Repair   INDICATIONS: Laceration   PROCEDURE PROVIDER: Dr Luis Barfield   SITE: Posterior scalp   TYPE/SIZE: simple, clean and no foreign body visualized  1.5 cm (total length)   FUNCTIONAL ASSESSMENT: Distal sensation, circulation and motor intact   MEDICATION: N/A   PREPARATION: irrigation with Normal saline   DEBRIDEMENT: no debridement and wound explored, no foreign body found   CLOSURE:  Superficial layer closed with metal staples    Total number of sutures/staples placed: 1             I, Santiago Nicole, am serving as a scribe to document services personally performed by Dr. Barfield based on my observation and the provider's statements to me. I, Luis Barfield, DO attest that Santiago Nicole is acting in a scribe capacity, has observed my performance of the services and has documented them in accordance with my direction.    Luis Barfield DO  Emergency Medicine  [facility] EMERGENCY DEPARTMENT  [address] [facility] [address]  [phone]  Dept: [phone]     Luis Barfield MD  05/05/23 2686